# Patient Record
Sex: MALE | Race: WHITE | Employment: STUDENT | URBAN - NONMETROPOLITAN AREA
[De-identification: names, ages, dates, MRNs, and addresses within clinical notes are randomized per-mention and may not be internally consistent; named-entity substitution may affect disease eponyms.]

---

## 2018-01-08 ENCOUNTER — TRANSFERRED RECORDS (OUTPATIENT)
Dept: HEALTH INFORMATION MANAGEMENT | Facility: HOSPITAL | Age: 23
End: 2018-01-08

## 2018-01-17 ENCOUNTER — OFFICE VISIT (OUTPATIENT)
Dept: SURGERY | Facility: OTHER | Age: 23
End: 2018-01-17
Attending: SURGERY
Payer: COMMERCIAL

## 2018-01-17 VITALS
SYSTOLIC BLOOD PRESSURE: 120 MMHG | BODY MASS INDEX: 25.18 KG/M2 | DIASTOLIC BLOOD PRESSURE: 64 MMHG | TEMPERATURE: 97.2 F | HEIGHT: 69 IN | OXYGEN SATURATION: 100 % | WEIGHT: 170 LBS | HEART RATE: 73 BPM

## 2018-01-17 DIAGNOSIS — L05.91 PILONIDAL CYST: Primary | ICD-10-CM

## 2018-01-17 PROCEDURE — 99243 OFF/OP CNSLTJ NEW/EST LOW 30: CPT | Performed by: SURGERY

## 2018-01-17 ASSESSMENT — PAIN SCALES - GENERAL: PAINLEVEL: NO PAIN (1)

## 2018-01-17 NOTE — MR AVS SNAPSHOT
After Visit Summary   1/17/2018    Edmundo Guerrero    MRN: [id number]           Patient Information     Date Of Birth          1995        Visit Information        Provider Department      1/17/2018 1:00 PM Benjamin Howard MD [facility]        Care Instructions    Thank you for allowing Dr. Howard and our surgical team to participate in your care.  If you have a scheduling or an appointment question please contact our Health Unit Coordinator, Irene,  at her direct line [phone].   ALL nursing questions or concerns can be directed to your surgical nurse at: [phone] Jason    You are scheduled for a: Pilonidal Cystectomy  Your procedure date is: Monday, January 22, 2018        HOW TO PREPARE-          You need a friend or family member available to drive you home AND stay with you for 24 hours after you leave the hospital. You will not be allowed to drive yourself. IF you need to take a taxi or the bus you MUST have a responsible person to ride with you. YOUR PROCEDURE WILL BE CANCELLED IF YOU DO NOT HAVE A RESPONSIBLE ADULT TO DRIVE YOU HOME.       You CANNOT have anything to eat or drink after midnight the night before your surgery, ncluding water and coffee. Your stomach needs to be completely empty. Do NOT chew gum, suck on hard candy, or smoke. You can brush your teeth the morning of surgery.       You need to call our Surgery Education Nurses 1-2 weeks prior to your surgery date at  [phone] or toll free [phone]. Please have you medication and allergy lists ready.      Stop your aspirin or other NSAIDs(Ibuprofen, Motrin, Aleve, Celebrex, Naproxen, etc...) 7 days before your surgery.      Hospital admitting will call you the day before your surgery with your arrival time. If you are scheduled on a Monday admitting will call you the Friday before.      Please call your primary care physician if you should become ill within 24 hours of scheduled  "surgery. (ex.vomiting, diarrhea, fever)          You will need to wash the night before AND the morning of you procedure with the supplied Hibiclens. Wash your Surgical area with your bare hands, apply friction and rinse. KEEP IT AWAY FROM YOUR EYES, EARS, NOSE AND MOUTH.             Follow-ups after your visit        Who to contact     If you have questions or need follow up information about today's clinic visit or your schedule please [facility] at [phone].  Normal or non-critical lab and imaging results will be communicated to you by MyChart, letter or phone within 4 business days after the clinic has received the results. If you do not hear from us within 7 days, please contact the clinic through Familon[facility]art or phone. If you have a critical or abnormal lab result, we will notify you by phone as soon as possible.  Submit refill requests through Cliq or call your pharmacy and they will forward the refill request to us. Please allow 3 business days for your refill to be completed.          Additional Information About Your Visit        [facility] Information     Cliq lets you send messages to your doctor, view your test results, renew your prescriptions, schedule appointments and more. To sign up, go to [email or web address] . Click on \"Log in\" on the left side of the screen, which will take you to the Welcome page. Then click on \"Sign up Now\" on the right side of the page.     You will be asked to enter the access code listed below, as well as some personal information. Please follow the directions to create your username and password.     Your access code is: [email or web address]  Expires: 2018  1:32 PM     Your access code will  in 90 days. If you need help or a new code, please call your [place] clinic or [phone].        Care EveryWhere ID     This is your Care EveryWhere ID. This could be used by other organizations to access your [place] medical " "records  [id number]        Your Vitals Were     Pulse Temperature Height Pulse Oximetry BMI (Body Mass Index)       73 97.2  F (36.2  C) (Tympanic) 5' 9\" (1.753 m) 100% 25.1 kg/m2        Blood Pressure from Last 3 Encounters:   01/17/18 120/64    Weight from Last 3 Encounters:   01/17/18 170 lb (77.1 kg)              Today, you had the following     No orders found for display       Primary Care Provider Office Phone # Fax #    Jayme JENNYFER Canas [phone]       [facility] [place] [address] [place]  [place] [address]        Equal Access to Services     [facility] : Hadii denae Balbuena, waefrem sunshine, ortiz kaalmada iram, maximilian bolaños . So [facility] [phone].    ATENCIÓN: Si habla español, tiene a paniagua disposición servicios gratuitos de asistencia lingüística. Llame al [phone].    We comply with applicable federal civil rights laws and [place] laws. We do not discriminate on the basis of race, color, national origin, age, disability, sex, sexual orientation, or gender identity.            Thank you!     Thank you for choosing [facility]  for your care. Our goal is always to provide you with excellent care. Hearing back from our patients is one way we can continue to improve our services. Please take a few minutes to complete the written survey that you may receive in the mail after your visit with us. Thank you!             Your Updated Medication List - Protect others around you: Learn how to safely use, store and throw away your medicines at www.disposemymeds.org.      Notice  As of 1/17/2018  1:32 PM    You have not been prescribed any medications.      "

## 2018-01-17 NOTE — PROGRESS NOTES
"[facility] Surgery Consultation    CC:  Pilonidal cyst    HPI:  This 22 year old year old male is seen at the request of Jayme Canas for evaluation of pilonidal cyst.  The history is obtained from the patient, and reviewing the medical record.  He is good medical historian. He states that he has had problems with a pilonidal cyst for multiple years. He has had the cyst lanced at his local clinic and was prescribed antibiotics. The last time that he had significant pain associated with it was in December. At that time the cyst drained spontaneously. He has never had any pain with defecation, bleeding, or spreading erythema associated with the lesion.     No past medical history on file.    No past surgical history on file.    Pt denied problems with bleeding or anesthesia    No current outpatient prescriptions on file.        No Known Allergies      HABITS:    Social History   Substance Use Topics     Smoking status: Never Smoker     Smokeless tobacco: Never Used     Alcohol use Not on file     No mood altering drug use.    No family history on file.    REVIEW OF SYSTEMS:  Ten point review of systems negative except those mentioned in the HPI.     The patient denies sleep apnea, latex allergies or MRSA    OBJECTIVE:    /64 (BP Location: Left arm, Patient Position: Chair, Cuff Size: Adult Large)  Pulse 73  Temp 97.2  F (36.2  C) (Tympanic)  Ht 5' 9\" (1.753 m)  Wt 170 lb (77.1 kg)  SpO2 100%  BMI 25.1 kg/m2    GENERAL: Generally appears well, in no distress with appropriate affect.  HEENT:   Sclerae anicteric - No cervical, supra/infraclavicular lymphadenopathy, no thyroid masses  Respiratory:  Lungs clear to ausculation bilaterally with good air excursion  Cardiovascular:  Regular Rate and Rhythm with no murmurs gallops or rubs, normal   :  deferred  Extremities:  Extremities normal. No deformities, edema, or skin discoloration.  Skin:  Right superior gluteal cleft reveals a 1 cm area of heaped " granulation tissue at site of pilonidal cyst/sinus. There is no erythema to the surrounding tissue and there are two pits along the midline with hair coming out of the pits.   Neurological: grossly intact    Psych:  Alert, oriented, affect appropriate with normal decision making ability.      IMPRESSION:  22 year old male with pilonidal disease    PLAN:  I discussed with the patient that as he has ongoing issues with his pilonidal disease I would recommend excision. The technical aspects of the procedure was discussed including excision of the cyst and the pits with flap closure. Informed consent was obtained discussing the risks including but not limited to bleeding, infection, wound breakdown. All questions and concerns were answered. We will proceed forward at a mutually convenient time.     Thank you for allowing me to participate in the care of your patient.           Benjamin Howard MD    1/17/2018  2:13 PM    cc:  Jayme Canas

## 2018-01-17 NOTE — PATIENT INSTRUCTIONS
Thank you for allowing Dr. Howard and our surgical team to participate in your care.  If you have a scheduling or an appointment question please contact our Health Unit Coordinator, Irene,  at her direct line [phone].   ALL nursing questions or concerns can be directed to your surgical nurse at: [phone] Jason    You are scheduled for a: Pilonidal Cystectomy  Your procedure date is: Monday, January 22, 2018        HOW TO PREPARE-          You need a friend or family member available to drive you home AND stay with you for 24 hours after you leave the hospital. You will not be allowed to drive yourself. IF you need to take a taxi or the bus you MUST have a responsible person to ride with you. YOUR PROCEDURE WILL BE CANCELLED IF YOU DO NOT HAVE A RESPONSIBLE ADULT TO DRIVE YOU HOME.       You CANNOT have anything to eat or drink after midnight the night before your surgery, ncluding water and coffee. Your stomach needs to be completely empty. Do NOT chew gum, suck on hard candy, or smoke. You can brush your teeth the morning of surgery.       You need to call our Surgery Education Nurses 1-2 weeks prior to your surgery date at  [phone] or toll free [phone]. Please have you medication and allergy lists ready.      Stop your aspirin or other NSAIDs(Ibuprofen, Motrin, Aleve, Celebrex, Naproxen, etc...) 7 days before your surgery.      Hospital admitting will call you the day before your surgery with your arrival time. If you are scheduled on a Monday admitting will call you the Friday before.      Please call your primary care physician if you should become ill within 24 hours of scheduled surgery. (ex.vomiting, diarrhea, fever)          You will need to wash the night before AND the morning of you procedure with the supplied Hibiclens. Wash your Surgical area with your bare hands, apply friction and rinse. KEEP IT AWAY FROM YOUR EYES, EARS, NOSE AND MOUTH.

## 2018-01-17 NOTE — NURSING NOTE
"Chief Complaint   Patient presents with     Consult     Consult from Dr Canas for pilonidal cyst 1 cm offf midline       Initial /64 (BP Location: Left arm, Patient Position: Chair, Cuff Size: Adult Large)  Pulse 73  Temp 97.2  F (36.2  C) (Tympanic)  Ht 5' 9\" (1.753 m)  Wt 170 lb (77.1 kg)  SpO2 100%  BMI 25.1 kg/m2 Estimated body mass index is 25.1 kg/(m^2) as calculated from the following:    Height as of this encounter: 5' 9\" (1.753 m).    Weight as of this encounter: 170 lb (77.1 kg).  Medication Reconciliation: complete     JEZ CHÁVEZ      "

## 2018-01-22 ENCOUNTER — ANESTHESIA (OUTPATIENT)
Dept: SURGERY | Facility: HOSPITAL | Age: 23
End: 2018-01-22
Payer: COMMERCIAL

## 2018-01-22 ENCOUNTER — HOSPITAL ENCOUNTER (OUTPATIENT)
Facility: HOSPITAL | Age: 23
Discharge: HOME | End: 2018-01-22
Attending: SURGERY | Admitting: SURGERY
Payer: COMMERCIAL

## 2018-01-22 ENCOUNTER — SURGERY (OUTPATIENT)
Age: 23
End: 2018-01-22

## 2018-01-22 ENCOUNTER — ANESTHESIA EVENT (OUTPATIENT)
Dept: SURGERY | Facility: HOSPITAL | Age: 23
End: 2018-01-22
Payer: COMMERCIAL

## 2018-01-22 VITALS
TEMPERATURE: 98.3 F | BODY MASS INDEX: 25.18 KG/M2 | HEIGHT: 69 IN | RESPIRATION RATE: 16 BRPM | SYSTOLIC BLOOD PRESSURE: 138 MMHG | WEIGHT: 169.97 LBS | DIASTOLIC BLOOD PRESSURE: 75 MMHG | OXYGEN SATURATION: 100 %

## 2018-01-22 DIAGNOSIS — L05.91 PILONIDAL CYST: Primary | ICD-10-CM

## 2018-01-22 PROCEDURE — 88304 TISSUE EXAM BY PATHOLOGIST: CPT | Mod: TC | Performed by: SURGERY

## 2018-01-22 PROCEDURE — 11770 EXC PILONIDAL CYST SIMPLE: CPT | Performed by: ANESTHESIOLOGY

## 2018-01-22 PROCEDURE — 71000014 ZZH RECOVERY PHASE 1 LEVEL 2 FIRST HR: Performed by: SURGERY

## 2018-01-22 PROCEDURE — 37000009 ZZH ANESTHESIA TECHNICAL FEE, EACH ADDTL 15 MIN: Performed by: SURGERY

## 2018-01-22 PROCEDURE — 11771 EXC PILONIDAL CYST XTNSV: CPT | Performed by: SURGERY

## 2018-01-22 PROCEDURE — 25000128 H RX IP 250 OP 636: Performed by: ANESTHESIOLOGY

## 2018-01-22 PROCEDURE — 25000125 ZZHC RX 250: Performed by: ANESTHESIOLOGY

## 2018-01-22 PROCEDURE — 25000128 H RX IP 250 OP 636: Performed by: SURGERY

## 2018-01-22 PROCEDURE — 37000008 ZZH ANESTHESIA TECHNICAL FEE, 1ST 30 MIN: Performed by: SURGERY

## 2018-01-22 PROCEDURE — 36000052 ZZH SURGERY LEVEL 2 EA 15 ADDTL MIN: Performed by: SURGERY

## 2018-01-22 PROCEDURE — 27210794 ZZH OR GENERAL SUPPLY STERILE: Performed by: SURGERY

## 2018-01-22 PROCEDURE — 25000128 H RX IP 250 OP 636: Performed by: NURSE ANESTHETIST, CERTIFIED REGISTERED

## 2018-01-22 PROCEDURE — 25000125 ZZHC RX 250: Performed by: NURSE ANESTHETIST, CERTIFIED REGISTERED

## 2018-01-22 PROCEDURE — 25000125 ZZHC RX 250: Performed by: SURGERY

## 2018-01-22 PROCEDURE — 71000027 ZZH RECOVERY PHASE 2 EACH 15 MINS: Performed by: SURGERY

## 2018-01-22 PROCEDURE — 40000306 ZZH STATISTIC PRE PROC ASSESS II: Performed by: SURGERY

## 2018-01-22 PROCEDURE — 36000050 ZZH SURGERY LEVEL 2 1ST 30 MIN: Performed by: SURGERY

## 2018-01-22 PROCEDURE — 01999 UNLISTED ANES PROCEDURE: CPT | Performed by: NURSE ANESTHETIST, CERTIFIED REGISTERED

## 2018-01-22 RX ORDER — FENTANYL CITRATE 50 UG/ML
INJECTION, SOLUTION INTRAMUSCULAR; INTRAVENOUS PRN
Status: DISCONTINUED | OUTPATIENT
Start: 2018-01-22 | End: 2018-01-22

## 2018-01-22 RX ORDER — MEPERIDINE HYDROCHLORIDE 25 MG/ML
12.5 INJECTION INTRAMUSCULAR; INTRAVENOUS; SUBCUTANEOUS
Status: DISCONTINUED | OUTPATIENT
Start: 2018-01-22 | End: 2018-01-22 | Stop reason: HOSPADM

## 2018-01-22 RX ORDER — FENTANYL CITRATE 50 UG/ML
25-50 INJECTION, SOLUTION INTRAMUSCULAR; INTRAVENOUS
Status: DISCONTINUED | OUTPATIENT
Start: 2018-01-22 | End: 2018-01-22 | Stop reason: HOSPADM

## 2018-01-22 RX ORDER — KETOROLAC TROMETHAMINE 30 MG/ML
30 INJECTION, SOLUTION INTRAMUSCULAR; INTRAVENOUS EVERY 6 HOURS PRN
Status: DISCONTINUED | OUTPATIENT
Start: 2018-01-22 | End: 2018-01-22 | Stop reason: HOSPADM

## 2018-01-22 RX ORDER — BUPIVACAINE HYDROCHLORIDE 2.5 MG/ML
INJECTION, SOLUTION EPIDURAL; INFILTRATION; INTRACAUDAL PRN
Status: DISCONTINUED | OUTPATIENT
Start: 2018-01-22 | End: 2018-01-22 | Stop reason: HOSPADM

## 2018-01-22 RX ORDER — SODIUM CHLORIDE, SODIUM LACTATE, POTASSIUM CHLORIDE, CALCIUM CHLORIDE 600; 310; 30; 20 MG/100ML; MG/100ML; MG/100ML; MG/100ML
INJECTION, SOLUTION INTRAVENOUS CONTINUOUS
Status: DISCONTINUED | OUTPATIENT
Start: 2018-01-22 | End: 2018-01-22 | Stop reason: HOSPADM

## 2018-01-22 RX ORDER — AMOXICILLIN 250 MG
1-2 CAPSULE ORAL 2 TIMES DAILY
Qty: 30 TABLET | Refills: 0 | Status: SHIPPED | OUTPATIENT
Start: 2018-01-22 | End: 2018-01-31

## 2018-01-22 RX ORDER — NALOXONE HYDROCHLORIDE 0.4 MG/ML
.1-.4 INJECTION, SOLUTION INTRAMUSCULAR; INTRAVENOUS; SUBCUTANEOUS
Status: DISCONTINUED | OUTPATIENT
Start: 2018-01-22 | End: 2018-01-22 | Stop reason: HOSPADM

## 2018-01-22 RX ORDER — PROPOFOL 10 MG/ML
INJECTION, EMULSION INTRAVENOUS PRN
Status: DISCONTINUED | OUTPATIENT
Start: 2018-01-22 | End: 2018-01-22

## 2018-01-22 RX ORDER — ONDANSETRON 2 MG/ML
INJECTION INTRAMUSCULAR; INTRAVENOUS PRN
Status: DISCONTINUED | OUTPATIENT
Start: 2018-01-22 | End: 2018-01-22

## 2018-01-22 RX ORDER — ONDANSETRON 4 MG/1
4 TABLET, ORALLY DISINTEGRATING ORAL EVERY 30 MIN PRN
Status: DISCONTINUED | OUTPATIENT
Start: 2018-01-22 | End: 2018-01-22 | Stop reason: HOSPADM

## 2018-01-22 RX ORDER — PROMETHAZINE HYDROCHLORIDE 25 MG/ML
12.5 INJECTION, SOLUTION INTRAMUSCULAR; INTRAVENOUS
Status: DISCONTINUED | OUTPATIENT
Start: 2018-01-22 | End: 2018-01-22 | Stop reason: HOSPADM

## 2018-01-22 RX ORDER — ALBUTEROL SULFATE 0.83 MG/ML
2.5 SOLUTION RESPIRATORY (INHALATION) EVERY 4 HOURS PRN
Status: DISCONTINUED | OUTPATIENT
Start: 2018-01-22 | End: 2018-01-22 | Stop reason: HOSPADM

## 2018-01-22 RX ORDER — HYDRALAZINE HYDROCHLORIDE 20 MG/ML
2.5-5 INJECTION INTRAMUSCULAR; INTRAVENOUS EVERY 10 MIN PRN
Status: DISCONTINUED | OUTPATIENT
Start: 2018-01-22 | End: 2018-01-22 | Stop reason: HOSPADM

## 2018-01-22 RX ORDER — DEXAMETHASONE SODIUM PHOSPHATE 10 MG/ML
INJECTION, SOLUTION INTRAMUSCULAR; INTRAVENOUS PRN
Status: DISCONTINUED | OUTPATIENT
Start: 2018-01-22 | End: 2018-01-22

## 2018-01-22 RX ORDER — KETOROLAC TROMETHAMINE 30 MG/ML
INJECTION, SOLUTION INTRAMUSCULAR; INTRAVENOUS PRN
Status: DISCONTINUED | OUTPATIENT
Start: 2018-01-22 | End: 2018-01-22

## 2018-01-22 RX ORDER — OXYCODONE HYDROCHLORIDE 5 MG/1
5-10 TABLET ORAL EVERY 4 HOURS PRN
Qty: 30 TABLET | Refills: 0 | Status: SHIPPED | OUTPATIENT
Start: 2018-01-22 | End: 2018-01-31

## 2018-01-22 RX ORDER — LIDOCAINE HYDROCHLORIDE 20 MG/ML
INJECTION, SOLUTION INFILTRATION; PERINEURAL PRN
Status: DISCONTINUED | OUTPATIENT
Start: 2018-01-22 | End: 2018-01-22

## 2018-01-22 RX ORDER — HYDROMORPHONE HYDROCHLORIDE 1 MG/ML
.3-.5 INJECTION, SOLUTION INTRAMUSCULAR; INTRAVENOUS; SUBCUTANEOUS EVERY 10 MIN PRN
Status: DISCONTINUED | OUTPATIENT
Start: 2018-01-22 | End: 2018-01-22 | Stop reason: HOSPADM

## 2018-01-22 RX ORDER — SCOLOPAMINE TRANSDERMAL SYSTEM 1 MG/1
1 PATCH, EXTENDED RELEASE TRANSDERMAL ONCE
Status: COMPLETED | OUTPATIENT
Start: 2018-01-22 | End: 2018-01-22

## 2018-01-22 RX ORDER — CEFAZOLIN SODIUM 2 G/100ML
2 INJECTION, SOLUTION INTRAVENOUS
Status: COMPLETED | OUTPATIENT
Start: 2018-01-22 | End: 2018-01-22

## 2018-01-22 RX ORDER — ONDANSETRON 2 MG/ML
4 INJECTION INTRAMUSCULAR; INTRAVENOUS EVERY 30 MIN PRN
Status: DISCONTINUED | OUTPATIENT
Start: 2018-01-22 | End: 2018-01-22 | Stop reason: HOSPADM

## 2018-01-22 RX ORDER — DEXAMETHASONE SODIUM PHOSPHATE 4 MG/ML
4 INJECTION, SOLUTION INTRA-ARTICULAR; INTRALESIONAL; INTRAMUSCULAR; INTRAVENOUS; SOFT TISSUE EVERY 10 MIN PRN
Status: DISCONTINUED | OUTPATIENT
Start: 2018-01-22 | End: 2018-01-22 | Stop reason: HOSPADM

## 2018-01-22 RX ADMIN — FENTANYL CITRATE 50 MCG: 50 INJECTION, SOLUTION INTRAMUSCULAR; INTRAVENOUS at 14:55

## 2018-01-22 RX ADMIN — LIDOCAINE HYDROCHLORIDE 40 MG: 20 INJECTION, SOLUTION INFILTRATION; PERINEURAL at 14:37

## 2018-01-22 RX ADMIN — HYDROMORPHONE HYDROCHLORIDE 1 MG: 1 INJECTION, SOLUTION INTRAMUSCULAR; INTRAVENOUS; SUBCUTANEOUS at 15:01

## 2018-01-22 RX ADMIN — BUPIVACAINE HYDROCHLORIDE 20 ML: 2.5 INJECTION, SOLUTION EPIDURAL; INFILTRATION; INTRACAUDAL at 15:23

## 2018-01-22 RX ADMIN — KETOROLAC TROMETHAMINE 30 MG: 30 INJECTION, SOLUTION INTRAMUSCULAR at 15:26

## 2018-01-22 RX ADMIN — SCOPALAMINE 1 PATCH: 1 PATCH, EXTENDED RELEASE TRANSDERMAL at 11:48

## 2018-01-22 RX ADMIN — Medication 100 MG: at 14:38

## 2018-01-22 RX ADMIN — CEFAZOLIN SODIUM 2 G: 2 INJECTION, SOLUTION INTRAVENOUS at 14:30

## 2018-01-22 RX ADMIN — FENTANYL CITRATE 100 MCG: 50 INJECTION, SOLUTION INTRAMUSCULAR; INTRAVENOUS at 14:33

## 2018-01-22 RX ADMIN — DEXAMETHASONE SODIUM PHOSPHATE 6 MG: 10 INJECTION, SOLUTION INTRAMUSCULAR; INTRAVENOUS at 14:48

## 2018-01-22 RX ADMIN — ONDANSETRON 4 MG: 2 INJECTION INTRAMUSCULAR; INTRAVENOUS at 15:26

## 2018-01-22 RX ADMIN — MEPERIDINE HYDROCHLORIDE 12.5 MG: 25 INJECTION, SOLUTION INTRAMUSCULAR; INTRAVENOUS; SUBCUTANEOUS at 16:08

## 2018-01-22 RX ADMIN — SODIUM CHLORIDE, POTASSIUM CHLORIDE, SODIUM LACTATE AND CALCIUM CHLORIDE: 600; 310; 30; 20 INJECTION, SOLUTION INTRAVENOUS at 11:48

## 2018-01-22 RX ADMIN — PROPOFOL 200 MG: 10 INJECTION, EMULSION INTRAVENOUS at 14:37

## 2018-01-22 RX ADMIN — MIDAZOLAM 2 MG: 1 INJECTION INTRAMUSCULAR; INTRAVENOUS at 14:30

## 2018-01-22 NOTE — IP AVS SNAPSHOT
MRN:[id number]                      After Visit Summary   1/22/2018    Edmundo Guerrero    MRN: [id number]           Thank you!     Thank you for choosing [facility] for your care. Our goal is always to provide you with excellent care. Hearing back from our patients is one way we can continue to improve our services. Please take a few minutes to complete the written survey that you may receive in the mail after you visit with us. Thank you!        Patient Information     Date Of Birth          1995        About your hospital stay     You were admitted on:  January 22, 2018 You last received care in the:  [facility] Preop/Phase II    You were discharged on:  January 22, 2018       Who to Call     For medical emergencies, please call 911.  For non-urgent questions about your medical care, please call your primary care provider or clinic, [phone]  For questions related to your surgery, please call your surgery clinic        Attending Provider     Provider Benjamin Felix MD Surgery       Primary Care Provider Office Phone # Fax #    Jayme Canas [phone]      After Care Instructions     Diet Instructions       Resume pre-procedure diet            Discharge Instructions       Patient to follow up with appointment in 2 weeks            Dressing       Keep dressing clean and dry.  Dressing / incisional care as instructed by RN and or Surgeon. May remove occlusive dressing in 48 hours.            Ice to affected area       Ice to operative site PRN            Shower       Do not get incision wet for 48 hours. After 48 hours you may remove the occlusive dressing and then the incision may get wet. There are steri-strips underlying the occlusive dressing which will fall off in 7-10 days.                  Further instructions from your care team                             Post-Anesthesia Patient Instructions    IMMEDIATELY FOLLOWING SURGERY:  Do not drive or operate  machinery for the first twenty four hours after surgery.  Do not make any important decisions for twenty four hours after surgery or while taking narcotic pain medications or sedatives.  If you develop intractable nausea and vomiting or a severe headache please notify your doctor immediately.    FOLLOW-UP:  Please make an appointment with your surgeon as instructed. You do not need to follow up with anesthesia unless specifically instructed to do so.    WOUND CARE INSTRUCTIONS (if applicable):  Keep a dry clean dressing on the anesthesia/puncture wound site if there is drainage.  Once the wound has quit draining you may leave it open to air.  Generally you should leave the bandage intact for twenty four hours unless there is drainage.  If the epidural site drains for more than 36-48 hours please call the anesthesia department.    QUESTIONS?:  Please feel free to call your physician or the hospital  if you have any questions, and they will be happy to assist you.       Remove the scopolamine patch behind your right ear after 24 hours after application.   After removing the patch, wash your hands and the area behind your ear thoroughly with soap and water.   The patch will still contain some medicine after use.   To avoid accidental contact or ingestion by children or pets, fold the used patch in half with the sticky side together and throw away in the trash out of the reach of children and pets.             Pilonidal Cyst    A pilonidal cyst is found near the base of the spine (tailbone) or top of the buttocks crease. It may look like a pit or small depression. In some cases, it may have a hollow tunnel (sinus tract) that connects it to the surface of the skin. Normally, a pilonidal cyst does not cause symptoms. But if it becomes infected, it can cause pain and swelling. This sheet tells you more about pilonidal cysts and how they are treated.  What causes a pilonidal cyst and who gets them?  Two main causes  are:    Ingrown hairs. This happens when a hair is forced under the skin or when a hair follicle ruptures.    Injury to the area. This can happen from sitting for long periods of time.  These cysts are often diagnosed in people between ages 16 and 26. But people of any age can have a pilonidal cyst. They affect both men and women, but they are more common in men.  Symptoms of a pilonidal cyst infection  A pilonidal cyst does not cause symptoms unless it becomes infected. Once a pilonidal cyst becomes infected, it is called a pilonidal abscess. Infection may cause the following symptoms:    Pain, redness, and swelling of the cyst and area around it    Foul-smelling drainage from the cyst    Fever  Diagnosing a pilonidal cyst  A pilonidal cyst can be diagnosed by how it looks and by its location. Your healthcare provider will examine the suspected cyst to confirm a diagnosis. You will be told if any tests are needed.  Treating a pilonidal cyst infection  Most pilonidal cysts are left alone. But if a cyst becomes infected, treatment is needed. It may include the following:    Incision and drainage. If needed, the cyst is cut open, and pus and other infected material is allowed to drain.    Antibiotic medicines for the infection. Know that medicines do not make the cyst go away, and antibiotics have limited use in treating an abscess. They also won t keep a cyst from becoming infected again.    Hot water soaks. These can help draw out the infection and ease pain and itching.    Surgery to remove the cyst (excision). This may be done if the infection is severe, does not respond to medicine, or keeps coming back. A surgeon cuts and removes the cyst and the tissue around it. Your healthcare provider can tell you more if this is needed.    Laser hair removalaround the area. This may decrease the frequency of flare-ups.  Preventing infection  A pilonidal cyst can easily become infected. Do the following to help prevent  infections:    Keep the cyst and surrounding skin area clean.    Remove hair from the area of the cyst regularly. Ask your healthcare provider about safe hair removal products or procedures.    Avoid sitting in one position for long periods of time. This helps to reduce weight and pressure on your tailbone area. Sitting on a special cushion to relieve pressure on the tailbone may also help. Ask your healthcare provider about where to purchase these cushions.    Avoid tight-fitting clothing to reduce skin irritation around the cyst.  Date Last Reviewed: 2/1/2017 2000-2017 [employer]. [address] [place] [address]. All rights reserved. This information is not intended as a substitute for professional medical care. Always follow your healthcare professional's instructions.        Incision Care  Remember: Follow-up visits allow your healthcare provider to make sure your incision is healing well. Be sure to keep your appointments.     Stitches (sutures), surgical staples, special strips of surgical tape, or surgical skin glue may be used to close incisions. They also help stop bleeding and speed healing. To help your incision heal, follow the tips on this handout.  Home care  Tips for home care include the following:    Always wash your hands before touching your incision.    Keep your incision clean and dry.    Avoid doing things that could cause dirt or sweat to get on your incision.    Don t pick at scabs. They help protect the wound.    Keep your incision out of water.    Take a sponge bath to avoid getting your incision wet, unless your healthcare provider tells you otherwise.    Ask your provider when can you take a shower or bathe.    Ask your provider about the best way to keep your incision dry when bathing or showering.    Pat stitches dry if they get wet. Don t rub.    Leave the bandage (dressing) in place until you are told to remove it or change it. Change it only as directed,  using clean hands.    After the first 12 hours, change your dressing every 24 hours, or as directed by your healthcare provider.    Change your dressing if it gets wet or soiled.  Care for specific closures  Follow these guidelines unless your healthcare provider tells you otherwise:    Stitches or staples. Once you no longer need to keep these dry, clean the wound daily. First remove the bandage using clean hands. Then wash the area gently with soap and warm water. Use a wet cotton swab to loosen and remove any blood or crust that forms. After cleaning, put a thin layer of antibiotic ointment on. Then put on a new bandage.    Skin glue. Don t put liquid, ointment, or cream on your wound while the glue is in place. Avoid activities that cause heavy sweating. Protect the wound from sunlight. Do not scratch, rub, or pick at the glue. Do not put tape directly over the glue. The glue should peel off within 5 to 10 days.    Surgical tape. Keep the area dry. If it gets wet, blot the area dry with a clean towel. Surgical tape usually falls off within 7 to 10 days. If it has not fallen off after 10 days, contact your healthcare provider before taking it off yourself. If you are told to remove the tape, put mineral oil or petroleum jelly on a cotton ball. Gently rub the tape until it is removed.  Changing your dressing  Leave the dressing (bandage) in place until you are told to remove it or change it. Follow the instructions below unless told otherwise by your healthcare provider:    Always wash your hands before changing your dressing.    After the first 48 hours, the incision wound usually will have closed. At this point, leave the incision uncovered and open to the air. If the incision has not closed keep it covered.    Cover your incision only if your clothing is rubbing it or causing irritation.    Change your dressing if it gets wet or soiled.  Follow-up care  Follow up with your healthcare provider to ask how long  "sutures or staples should be left in place. Be sure to return for stitch or staple removal as directed. If dissolving stitches were used in your mouth, these will not need to be removed. They should fall out or dissolve on their own.  If tape closures were used, remove them yourself when your provider recommends if they have not fallen off on their own. If skin glue was used, the glue will wear off by itself.  When to seek medical care  Call your healthcare provider if you have any of the following:    More pain, redness, swelling, bleeding, or foul-smelling discharge around the incision area    Fever of 100.4 F (38 C) or higher, or as directed by your healthcare provider    Shaking chills    Vomiting or nausea that doesn't go away    Numbness, coldness, or tingling around the incision area, or changes in skin color    Opening of the sutures or wound    Stitches or staples come apart or fall out or surgical tape falls off before 7 days or as directed by your healthcare provider   Date Last Reviewed: 12/1/2016 2000-2017 [employer]. [address] [place] [address]. All rights reserved. This information is not intended as a substitute for professional medical care. Always follow your healthcare professional's instructions.              Pending Results     No orders found from 1/20/2018 to 1/23/2018.            Admission Information     Date & Time Provider Department Dept. Phone    1/22/2018 Benjamin Howard MD [facility] Preop/Phase [phone]      Your Vitals Were     Blood Pressure Temperature Respirations Height Weight Pulse Oximetry    153/87 98.3  F (36.8  C) 16 1.753 m (5' 9\") 77.1 kg (169 lb 15.6 oz) 98%    BMI (Body Mass Index)                   25.1 kg/m2           MyChart Information     [facility] lets you send messages to your doctor, view your test results, renew your prescriptions, schedule appointments and more. To sign up, go to [email or web address] . Click on \"Log in\" on " "the left side of the screen, which will take you to the Welcome page. Then click on \"Sign up Now\" on the right side of the page.     You will be asked to enter the access code listed below, as well as some personal information. Please follow the directions to create your username and password.     Your access code is: [email or web address]  Expires: 2018  1:32 PM     Your access code will  in 90 days. If you need help or a new code, please call your [facility] clinic or [phone].        Care EveryWhere ID     This is your Care EveryWhere ID. This could be used by other organizations to access your [facility] medical records  [id number]        Equal Access to Services     DIAN CULP : Shyann Balbuena, krys sunshine, ortiz kaalmalaura walden, maximilian trinh. So [facility] [phone].    ATENCIÓN: Si habla español, tiene a paniagua disposición servicios gratuitos de asistencia lingüística. Llame al [phone].    We comply with applicable federal civil rights laws and [place] laws. We do not discriminate on the basis of race, color, national origin, age, disability, sex, sexual orientation, or gender identity.               Review of your medicines      START taking        Dose / Directions    oxyCODONE IR 5 MG tablet   Commonly known as:  ROXICODONE   Used for:  Pilonidal cyst        Dose:  5-10 mg   Take 1-2 tablets (5-10 mg) by mouth every 4 hours as needed for pain or other (Moderate to Severe)   Quantity:  30 tablet   Refills:  0       senna-docusate 8.6-50 MG per tablet   Commonly known as:  SENOKOT-S;PERICOLACE   Used for:  Pilonidal cyst        Dose:  1-2 tablet   Take 1-2 tablets by mouth 2 times daily Take while on oral narcotics to prevent or treat constipation.   Quantity:  30 tablet   Refills:  0            Where to get your medicines      These medications were sent to [facility] - [address]  [address] [address]    "  Phone:  [phone]     senna-docusate 8.6-50 MG per tablet         Some of these will need a paper prescription and others can be bought over the counter. Ask your nurse if you have questions.     Bring a paper prescription for each of these medications     oxyCODONE IR 5 MG tablet                Protect others around you: Learn how to safely use, store and throw away your medicines at www.disposemymeds.org.             Medication List: This is a list of all your medications and when to take them. Check marks below indicate your daily home schedule. Keep this list as a reference.      Medications           Morning Afternoon Evening Bedtime As Needed    oxyCODONE IR 5 MG tablet   Commonly known as:  ROXICODONE   Take 1-2 tablets (5-10 mg) by mouth every 4 hours as needed for pain or other (Moderate to Severe)                                senna-docusate 8.6-50 MG per tablet   Commonly known as:  SENOKOT-S;PERICOLACE   Take 1-2 tablets by mouth 2 times daily Take while on oral narcotics to prevent or treat constipation.

## 2018-01-22 NOTE — DISCHARGE INSTRUCTIONS
Post-Anesthesia Patient Instructions    IMMEDIATELY FOLLOWING SURGERY:  Do not drive or operate machinery for the first twenty four hours after surgery.  Do not make any important decisions for twenty four hours after surgery or while taking narcotic pain medications or sedatives.  If you develop intractable nausea and vomiting or a severe headache please notify your doctor immediately.    FOLLOW-UP:  Please make an appointment with your surgeon as instructed. You do not need to follow up with anesthesia unless specifically instructed to do so.    WOUND CARE INSTRUCTIONS (if applicable):  Keep a dry clean dressing on the anesthesia/puncture wound site if there is drainage.  Once the wound has quit draining you may leave it open to air.  Generally you should leave the bandage intact for twenty four hours unless there is drainage.  If the epidural site drains for more than 36-48 hours please call the anesthesia department.    QUESTIONS?:  Please feel free to call your physician or the hospital  if you have any questions, and they will be happy to assist you.       Remove the scopolamine patch behind your right ear after 24 hours after application.   After removing the patch, wash your hands and the area behind your ear thoroughly with soap and water.   The patch will still contain some medicine after use.   To avoid accidental contact or ingestion by children or pets, fold the used patch in half with the sticky side together and throw away in the trash out of the reach of children and pets.             Pilonidal Cyst    A pilonidal cyst is found near the base of the spine (tailbone) or top of the buttocks crease. It may look like a pit or small depression. In some cases, it may have a hollow tunnel (sinus tract) that connects it to the surface of the skin. Normally, a pilonidal cyst does not cause symptoms. But if it becomes infected, it can cause pain and swelling. This sheet tells you  more about pilonidal cysts and how they are treated.  What causes a pilonidal cyst and who gets them?  Two main causes are:    Ingrown hairs. This happens when a hair is forced under the skin or when a hair follicle ruptures.    Injury to the area. This can happen from sitting for long periods of time.  These cysts are often diagnosed in people between ages 16 and 26. But people of any age can have a pilonidal cyst. They affect both men and women, but they are more common in men.  Symptoms of a pilonidal cyst infection  A pilonidal cyst does not cause symptoms unless it becomes infected. Once a pilonidal cyst becomes infected, it is called a pilonidal abscess. Infection may cause the following symptoms:    Pain, redness, and swelling of the cyst and area around it    Foul-smelling drainage from the cyst    Fever  Diagnosing a pilonidal cyst  A pilonidal cyst can be diagnosed by how it looks and by its location. Your healthcare provider will examine the suspected cyst to confirm a diagnosis. You will be told if any tests are needed.  Treating a pilonidal cyst infection  Most pilonidal cysts are left alone. But if a cyst becomes infected, treatment is needed. It may include the following:    Incision and drainage. If needed, the cyst is cut open, and pus and other infected material is allowed to drain.    Antibiotic medicines for the infection. Know that medicines do not make the cyst go away, and antibiotics have limited use in treating an abscess. They also won t keep a cyst from becoming infected again.    Hot water soaks. These can help draw out the infection and ease pain and itching.    Surgery to remove the cyst (excision). This may be done if the infection is severe, does not respond to medicine, or keeps coming back. A surgeon cuts and removes the cyst and the tissue around it. Your healthcare provider can tell you more if this is needed.    Laser hair removalaround the area. This may decrease the frequency  of flare-ups.  Preventing infection  A pilonidal cyst can easily become infected. Do the following to help prevent infections:    Keep the cyst and surrounding skin area clean.    Remove hair from the area of the cyst regularly. Ask your healthcare provider about safe hair removal products or procedures.    Avoid sitting in one position for long periods of time. This helps to reduce weight and pressure on your tailbone area. Sitting on a special cushion to relieve pressure on the tailbone may also help. Ask your healthcare provider about where to purchase these cushions.    Avoid tight-fitting clothing to reduce skin irritation around the cyst.  Date Last Reviewed: 2/1/2017 2000-2017 [employer]. [address] [place] [address], [place] [address]. All rights reserved. This information is not intended as a substitute for professional medical care. Always follow your healthcare professional's instructions.        Incision Care  Remember: Follow-up visits allow your healthcare provider to make sure your incision is healing well. Be sure to keep your appointments.     Stitches (sutures), surgical staples, special strips of surgical tape, or surgical skin glue may be used to close incisions. They also help stop bleeding and speed healing. To help your incision heal, follow the tips on this handout.  Home care  Tips for home care include the following:    Always wash your hands before touching your incision.    Keep your incision clean and dry.    Avoid doing things that could cause dirt or sweat to get on your incision.    Don t pick at scabs. They help protect the wound.    Keep your incision out of water.    Take a sponge bath to avoid getting your incision wet, unless your healthcare provider tells you otherwise.    Ask your provider when can you take a shower or bathe.    Ask your provider about the best way to keep your incision dry when bathing or showering.    Pat stitches dry if they get wet. Don t  rub.    Leave the bandage (dressing) in place until you are told to remove it or change it. Change it only as directed, using clean hands.    After the first 12 hours, change your dressing every 24 hours, or as directed by your healthcare provider.    Change your dressing if it gets wet or soiled.  Care for specific closures  Follow these guidelines unless your healthcare provider tells you otherwise:    Stitches or staples. Once you no longer need to keep these dry, clean the wound daily. First remove the bandage using clean hands. Then wash the area gently with soap and warm water. Use a wet cotton swab to loosen and remove any blood or crust that forms. After cleaning, put a thin layer of antibiotic ointment on. Then put on a new bandage.    Skin glue. Don t put liquid, ointment, or cream on your wound while the glue is in place. Avoid activities that cause heavy sweating. Protect the wound from sunlight. Do not scratch, rub, or pick at the glue. Do not put tape directly over the glue. The glue should peel off within 5 to 10 days.    Surgical tape. Keep the area dry. If it gets wet, blot the area dry with a clean towel. Surgical tape usually falls off within 7 to 10 days. If it has not fallen off after 10 days, contact your healthcare provider before taking it off yourself. If you are told to remove the tape, put mineral oil or petroleum jelly on a cotton ball. Gently rub the tape until it is removed.  Changing your dressing  Leave the dressing (bandage) in place until you are told to remove it or change it. Follow the instructions below unless told otherwise by your healthcare provider:    Always wash your hands before changing your dressing.    After the first 48 hours, the incision wound usually will have closed. At this point, leave the incision uncovered and open to the air. If the incision has not closed keep it covered.    Cover your incision only if your clothing is rubbing it or  causing irritation.    Change your dressing if it gets wet or soiled.  Follow-up care  Follow up with your healthcare provider to ask how long sutures or staples should be left in place. Be sure to return for stitch or staple removal as directed. If dissolving stitches were used in your mouth, these will not need to be removed. They should fall out or dissolve on their own.  If tape closures were used, remove them yourself when your provider recommends if they have not fallen off on their own. If skin glue was used, the glue will wear off by itself.  When to seek medical care  Call your healthcare provider if you have any of the following:    More pain, redness, swelling, bleeding, or foul-smelling discharge around the incision area    Fever of 100.4 F (38 C) or higher, or as directed by your healthcare provider    Shaking chills    Vomiting or nausea that doesn't go away    Numbness, coldness, or tingling around the incision area, or changes in skin color    Opening of the sutures or wound    Stitches or staples come apart or fall out or surgical tape falls off before 7 days or as directed by your healthcare provider   Date Last Reviewed: 12/1/2016 2000-2017 [employer]. [address] [place] [address], [place] [address]. All rights reserved. This information is not intended as a substitute for professional medical care. Always follow your healthcare professional's instructions.

## 2018-01-22 NOTE — OR NURSING
Pateint discharged to home.  Alfonso score 19. Pain level 0/10.  Discharged from unit via wheelchair.

## 2018-01-22 NOTE — IP AVS SNAPSHOT
[facility] Preop/Phase II    [address] [address]    Phone:  [phone]                                       After Visit Summary   1/22/2018    Edmundo Guerrero    MRN: [id number]           After Visit Summary Signature Page     I have received my discharge instructions, and my questions have been answered. I have discussed any challenges I see with this plan with the nurse or doctor.    ..........................................................................................................................................  Patient/Patient Representative Signature      ..........................................................................................................................................  Patient Representative Print Name and Relationship to Patient    ..................................................               ................................................  Date                                            Time    ..........................................................................................................................................  Reviewed by Signature/Title    ...................................................              ..............................................  Date                                                            Time

## 2018-01-22 NOTE — ANESTHESIA PREPROCEDURE EVALUATION
Anesthesia Evaluation     . Pt has not had prior anesthetic            ROS/MED HX    ENT/Pulmonary:       Neurologic:  - neg neurologic ROS     Cardiovascular:  - neg cardiovascular ROS       METS/Exercise Tolerance:     Hematologic:  - neg hematologic  ROS       Musculoskeletal:   (+) , , other musculoskeletal- PILONIDAL CYST      GI/Hepatic:  - neg GI/hepatic ROS       Renal/Genitourinary:  - ROS Renal section negative       Endo:  - neg endo ROS       Psychiatric:  - neg psychiatric ROS       Infectious Disease:  - neg infectious disease ROS       Malignancy:      - no malignancy   Other:    - neg other ROS                 Physical Exam  Normal systems: cardiovascular, pulmonary and dental    Airway   Mallampati: III  TM distance: >3 FB  Neck ROM: full    Dental     Cardiovascular   Rhythm and rate: regular and normal      Pulmonary    breath sounds clear to auscultation                    Anesthesia Plan      History & Physical Review  History and physical reviewed and following examination; no interval change.    ASA Status:  2 .    NPO Status:  > 8 hours    Plan for General and ETT with Intravenous and Propofol induction. Maintenance will be Balanced.    PONV prophylaxis:  Ondansetron (or other 5HT-3), Scopolamine patch and Dexamethasone or Solumedrol  Prone Positioning       Postoperative Care  Postoperative pain management:  IV analgesics and Oral pain medications.      Consents  Anesthetic plan, risks, benefits and alternatives discussed with:  Patient..                          .

## 2018-01-22 NOTE — OP NOTE
PREOPERATIVE DIAGNOSIS:  Pilonidal sinus with repeated infections.      POSTOPERATIVE DIAGNOSIS:  Pilonidal sinus with repeated infections. Pilonidal cyst.     PROCEDURE PERFORMED:  Excision pilonidal cyst/sinus with primary repair.      HISTORY:  This 22-year-old male has had repetitive infections with a large pilonidal process.  He presents for elective repair.      DESCRIPTION OF PROCEDURE:  With the patient in the supine position on the operating table, general anesthesia was induced.  He was rolled to the prone position with a roll beneath the pubis and the perianal region, buttocks, and spine were prepped and draped sterilely.  The requisite timeout pause was observed during which the patient's correct identity and planned procedure was confirmed by the operating room personnel in attendance. There was a small sinus just superior to the anus that had jes of hair protruding from it. The hair was removed. The sinus was then curretted and irrigated.  An elliptical incision to include visible operculae and, sinus tracts and jes of hair was taken through full thickness skin and subcutaneous tissue.  The skin and full thickness-subcutaneous tissue was elevated off the presacral fascia and the incision was extended to include the sinus tracts, both to the left and right of midline.  The specimen was excised and submitted for histopathologic examination.  The wound was irrigated with copious amounts of saline solution and closed in layers to obliterate all dead space.  The deep tissues were reapproximated to the presacral fascia with interrupted 3-0 vicryl.  The superficial subcutaneous tissues were reapproximated with 3-0 vicryl.  Subcutaneous tissues were further reapproximated with interrupted 3-0 vicryl and the skin was reapproximated with subdermal 4-0 monocryl and steri-strips.  Sterile dressings were applied and the patient was transported to the recovery room in satisfactory condition.  The estimated blood  loss was minimal and there were no apparent complications.  The procedure was well tolerated and the patient left the operating room in good condition upon confirmation that the final sponge, needle and instrument counts were correct.        Benjamin Howard  1/22/2018

## 2018-01-22 NOTE — ANESTHESIA CARE TRANSFER NOTE
Patient: Edmundo Guerrero    Procedure(s):  PILONIDAL CYSTECTOMY - Wound Class: II-Clean Contaminated    Diagnosis: PILONIDAL CYST  Diagnosis Additional Information: No value filed.    Anesthesia Type:   General, ETT     Note:  Airway :Room Air  Patient transferred to:PACU  Handoff Report: Identifed the Patient, Identified the Reponsible Provider, Reviewed the pertinent medical history, Discussed the surgical course, Reviewed Intra-OP anesthesia mangement and issues during anesthesia, Set expectations for post-procedure period and Allowed opportunity for questions and acknowledgement of understanding      Vitals: (Last set prior to Anesthesia Care Transfer)    CRNA VITALS  1/22/2018 1503 - 1/22/2018 1536      1/22/2018             Resp Rate (set): 8                Electronically Signed By: TERI Champion CRNA  January 22, 2018  3:36 PM

## 2018-01-23 NOTE — ANESTHESIA POSTPROCEDURE EVALUATION
Patient: Edmundo Guerrero    Procedure(s):  PILONIDAL CYSTECTOMY - Wound Class: II-Clean Contaminated    Diagnosis:PILONIDAL CYST  Diagnosis Additional Information: No value filed.    Anesthesia Type:  General, ETT    Note:  Anesthesia Post Evaluation    Patient location during evaluation: Phase 2, Bedside and PACU  Patient participation: Able to fully participate in evaluation  Level of consciousness: awake and alert  Pain management: adequate  Airway patency: patent  Cardiovascular status: acceptable  Respiratory status: acceptable  Hydration status: stable  PONV: none     Anesthetic complications: None          Last vitals:  Vitals:    01/22/18 1645 01/22/18 1655 01/22/18 1700   BP: 134/83 134/76 138/75   Resp: 16 16 16   Temp:      SpO2: 99% 99% 100%         Electronically Signed By: Gabo Lomax MD  January 23, 2018  10:19 AM

## 2018-01-24 LAB — COPATH REPORT: NORMAL

## 2018-01-31 ENCOUNTER — OFFICE VISIT (OUTPATIENT)
Dept: SURGERY | Facility: OTHER | Age: 23
End: 2018-01-31
Attending: SURGERY

## 2018-01-31 VITALS
DIASTOLIC BLOOD PRESSURE: 72 MMHG | BODY MASS INDEX: 25.18 KG/M2 | WEIGHT: 170 LBS | TEMPERATURE: 97.6 F | HEART RATE: 107 BPM | SYSTOLIC BLOOD PRESSURE: 130 MMHG | HEIGHT: 69 IN | OXYGEN SATURATION: 100 %

## 2018-01-31 DIAGNOSIS — Z23 NEED FOR PROPHYLACTIC VACCINATION AND INOCULATION AGAINST INFLUENZA: Primary | ICD-10-CM

## 2018-01-31 DIAGNOSIS — Z09 POSTOP CHECK: ICD-10-CM

## 2018-01-31 PROCEDURE — 90471 IMMUNIZATION ADMIN: CPT | Performed by: SURGERY

## 2018-01-31 PROCEDURE — 90686 IIV4 VACC NO PRSV 0.5 ML IM: CPT | Performed by: SURGERY

## 2018-01-31 PROCEDURE — 99024 POSTOP FOLLOW-UP VISIT: CPT | Performed by: SURGERY

## 2018-01-31 NOTE — PATIENT INSTRUCTIONS
Thank you for allowing Dr. Howard and our surgical team to participate in your care. Please call with any scheduling questions or concerns to Irene at [phone] or for nursing questions Liza [phone]

## 2018-01-31 NOTE — PROGRESS NOTES
HPI:  Returns for his first post surgical examination following pilonidal cystectomy without complaint.  Denies wound complication, fever, chills, nausea or vomiting.  ROS:   10 point ROS neg other than the symptoms noted above in the HPI.    Examination:  There were no vitals taken for this visit.    Constitutional: healthy, alert and no distress  Wound(s) healing nicely without evidence of infection. Incision is clean/dry/intact. No surrounding erythema, mild induration along incision, no drainage.    Impression:   22 year old male status post excision of pilonidal cyst and tract.  Recommendations:  Mr. Guerrero has done quite well from his pilonidal procedure. At this time there are no concerns. He can call the office on a PRN basis. All questions and concerns were addressed.     Benjamin Howard MD    1/31/2018  1:38 PM

## 2018-01-31 NOTE — PROGRESS NOTES

## 2018-01-31 NOTE — MR AVS SNAPSHOT
"              After Visit Summary   1/31/2018    Edmundo Guerrero    MRN: [id number]           Patient Information     Date Of Birth          1995        Visit Information        Provider Department      1/31/2018 2:00 PM Benjamin Howard MD [facility]        Care Instructions    Thank you for allowing Dr. Howard and our surgical team to participate in your care. Please call with any scheduling questions or concerns to Irene at [phone] or for nursing questions Liza [phone]            Follow-ups after your visit        Your next 10 appointments already scheduled     Jan 31, 2018  2:00 PM CST   (Arrive by 1:45 PM)   Post Op with Benjamin Howard MD   [facility] [place] ([facility] [place] )    [address]  [place] [id number] [phone]              Who to contact     If you have questions or need follow up information about today's clinic visit or your schedule please contact [facility] directly at [phone].  Normal or non-critical lab and imaging results will be communicated to you by MyChart, letter or phone within 4 business days after the clinic has received the results. If you do not hear from us within 7 days, please contact the clinic through [employer] or phone. If you have a critical or abnormal lab result, we will notify you by phone as soon as possible.  Submit refill requests through [employer] or call your pharmacy and they will forward the refill request to us. Please allow 3 business days for your refill to be completed.          Additional Information About Your Visit        [employer] Information     [employer] lets you send messages to your doctor, view your test results, renew your prescriptions, schedule appointments and more. To sign up, go to [email or web address] . Click on \"Log in\" on the left side of the screen, which will take you to the Welcome page. Then click on \"Sign up Now\" on the right side of the page. " "    You will be asked to enter the access code listed below, as well as some personal information. Please follow the directions to create your username and password.     Your access code is: [email or web address]  Expires: 2018  1:32 PM     Your access code will  in 90 days. If you need help or a new code, please call your [facility] clinic or [phone].        Care EveryWhere ID     This is your Care EveryWhere ID. This could be used by other organizations to access your [facility] medical records  [id number]        Your Vitals Were     Pulse Temperature Height Pulse Oximetry BMI (Body Mass Index)       107 97.6  F (36.4  C) (Tympanic) 5' 9\" (1.753 m) 100% 25.1 kg/m2        Blood Pressure from Last 3 Encounters:   18 130/72   18 138/75   18 120/64    Weight from Last 3 Encounters:   18 170 lb (77.1 kg)   18 169 lb 15.6 oz (77.1 kg)   18 170 lb (77.1 kg)              Today, you had the following     No orders found for display         Today's Medication Changes          These changes are accurate as of 18  1:45 PM.  If you have any questions, ask your nurse or doctor.               Stop taking these medicines if you haven't already. Please contact your care team if you have questions.     oxyCODONE IR 5 MG tablet   Commonly known as:  ROXICODONE   Stopped by:  Benjamin Howard MD           senna-docusate 8.6-50 MG per tablet   Commonly known as:  SENOKOT-S;PERICOLACE   Stopped by:  Benjamin Howard MD                    Primary Care Provider Office Phone # Fax #    Jayme BURGER MD Missael [phone]       [facility] [address] [place] [address]        Equal Access to Services     [facility]: Shyann Balbuena, krys sunshine, maximilian casiano . [facility] [phone].    ATENCIÓN: Si habla español, tiene a paniagua disposición servicios gratuitos de asistencia lingüística. " Cheryl acevedo [phone].    We comply with applicable federal civil rights laws and [place] laws. We do not discriminate on the basis of race, color, national origin, age, disability, sex, sexual orientation, or gender identity.            Thank you!     Thank you for choosing [facility]  for your care. Our goal is always to provide you with excellent care. Hearing back from our patients is one way we can continue to improve our services. Please take a few minutes to complete the written survey that you may receive in the mail after your visit with us. Thank you!             Your Updated Medication List - Protect others around you: Learn how to safely use, store and throw away your medicines at www.disposemymeds.org.      Notice  As of 1/31/2018  1:45 PM    You have not been prescribed any medications.

## 2018-01-31 NOTE — NURSING NOTE
"Chief Complaint   Patient presents with     Surgical Followup     s/p-pilonidal cystectomy 1/22/18       Initial /72 (BP Location: Left arm, Patient Position: Chair, Cuff Size: Adult Regular)  Pulse 107  Temp 97.6  F (36.4  C) (Tympanic)  Ht 5' 9\" (1.753 m)  Wt 170 lb (77.1 kg)  SpO2 100%  BMI 25.1 kg/m2 Estimated body mass index is 25.1 kg/(m^2) as calculated from the following:    Height as of this encounter: 5' 9\" (1.753 m).    Weight as of this encounter: 170 lb (77.1 kg).  Medication Reconciliation: brenda JAVIER      "

## 2018-07-25 ENCOUNTER — OFFICE VISIT (OUTPATIENT)
Dept: SURGERY | Facility: OTHER | Age: 23
End: 2018-07-25
Attending: SURGERY
Payer: COMMERCIAL

## 2018-07-25 VITALS
HEIGHT: 69 IN | DIASTOLIC BLOOD PRESSURE: 83 MMHG | WEIGHT: 170 LBS | OXYGEN SATURATION: 100 % | BODY MASS INDEX: 25.18 KG/M2 | HEART RATE: 87 BPM | TEMPERATURE: 96.5 F | SYSTOLIC BLOOD PRESSURE: 139 MMHG

## 2018-07-25 DIAGNOSIS — L98.8 PILONIDAL DISEASE: Primary | ICD-10-CM

## 2018-07-25 PROCEDURE — 99213 OFFICE O/P EST LOW 20 MIN: CPT | Performed by: SURGERY

## 2018-07-25 ASSESSMENT — PAIN SCALES - GENERAL: PAINLEVEL: NO PAIN (0)

## 2018-07-25 NOTE — PATIENT INSTRUCTIONS
Thank you for allowing Dr. Howard and our surgical team to participate in your care. Please call with any scheduling questions or concerns to Irene at [phone] or for nursing questions to Sonal [phone]

## 2018-07-25 NOTE — MR AVS SNAPSHOT
"              After Visit Summary   7/25/2018    Edmundo Guerrero    MRN: [id number]           Patient Information     Date Of Birth          1995        Visit Information        Provider Department      7/25/2018 10:00 AM [facility]        Today's Diagnoses     Pilonidal disease    -  1      Care Instructions    Thank you for allowing Dr. Howard and our surgical team to participate in your care. Please call with any scheduling questions or concerns to Irene at [phone] or for nursing questions to Sonal [phone]            Follow-ups after your visit        Who to contact     If you have questions or need follow up information about today's clinic visit or your schedule please contact [facility] directly at [phone].  Normal or non-critical lab and imaging results will be communicated to you by MyChart, letter or phone within 4 business days after the clinic has received the results. If you do not hear from us within 7 days, please contact the clinic through MyChart or phone. If you have a critical or abnormal lab result, we will notify you by phone as soon as possible.  Submit refill requests through [employer] or call your pharmacy and they will forward the refill request to us. Please allow 3 business days for your refill to be completed.          Additional Information About Your Visit        Care EveryWhere ID     This is your Care EveryWhere ID. This could be used by other organizations to access your [facility] medical records  [id number]        Your Vitals Were     Pulse Temperature Height Pulse Oximetry BMI (Body Mass Index)       87 96.5  F (35.8  C) (Tympanic) 5' 9\" (1.753 m) 100% 25.1 kg/m2        Blood Pressure from Last 3 Encounters:   07/25/18 139/83   01/31/18 130/72   01/22/18 138/75    Weight from Last 3 Encounters:   07/25/18 170 lb (77.1 kg)   01/31/18 170 lb (77.1 kg)   01/22/18 169 lb 15.6 oz (77.1 kg)              Today, " you had the following     No orders found for display       Primary Care Provider Office Phone # Fax #    Jayme JENNYFER Canas [phone]       [facility] [address] [address]        Equal Access to Services     [facility] : Hadii denae del rio hadbessyo Soomaali, waaxda luqadaha, qaybta kaalmada adeegyada, maximilian dubois leolachristine gantcrescencioliliana bolaños . So [facility] [phone].    ATENCIÓN: Si habla español, tiene a paniagua disposición servicios gratuitos de asistencia lingüística. Llame al [phone].    We comply with applicable federal civil rights laws and [place] laws. We do not discriminate on the basis of race, color, national origin, age, disability, sex, sexual orientation, or gender identity.            Thank you!     Thank you for choosing [facility]  for your care. Our goal is always to provide you with excellent care. Hearing back from our patients is one way we can continue to improve our services. Please take a few minutes to complete the written survey that you may receive in the mail after your visit with us. Thank you!             Your Updated Medication List - Protect others around you: Learn how to safely use, store and throw away your medicines at www.disposemymeds.org.      Notice  As of 7/25/2018 10:08 AM    You have not been prescribed any medications.

## 2018-07-25 NOTE — PROGRESS NOTES
"SUBJECTIVE:  Mr. Guerrero returns with concerns about recurrent pilonidal disease. He says that while he was gone on his time away this past spring he was doing good and then this summer he noticed some increased pain. He then had some bloody drainage near his anus. He says the discomfort he had was not as severe as it was previously. He was concerned that it may get worse when he is sitting down and throughout this next semester. He wanted to be evaluated.     During his previous cystectomy there was a sinus/pit adjacent to the anus that was no included in the resection due to healing concerns and his up and coming trip being planned. At that time it was discussed that he may develop further symptoms but we could address them as they came up.    OBJECTIVE:  /83  Pulse 87  Temp 96.5  F (35.8  C) (Tympanic)  Ht 5' 9\" (1.753 m)  Wt 170 lb (77.1 kg)  SpO2 100%  BMI 25.1 kg/m2    Gen: AAA, NAD  Perineum: gluteal cleft deep with significant hair over gluteal cheeks and cleft. Previous well healed incision line with small pilonidal pit at superior aspect and the another large pit adjacent to the anus with hair. No surrounding erythema to pits or along previous incision. There is no fluctuance    ASSESSMENT/PLAN:  21 yo male with pilonidal disease    As he has ongoing disease with minimal symptoms I recommended conservative treatment with observation. I suggested good hygiene and shaving of the area to keep it clean and minimize risk of infection. If he does develop infection of his pilonidal sinus/pits he may need antibiotics and then possibly a further operative intervention with cleft lift procedure. All questions and concerns were addressed. We will proceed with observation for now and if he develops worsening symptoms this semester he can come in for an evaluation and possible scheduling of an elective procedure.     Benjamin Howard  7/25/2018      "

## 2018-07-25 NOTE — NURSING NOTE
"Chief Complaint   Patient presents with     Follow Up For     pilonidal cyst       Initial /83  Pulse 87  Temp 96.5  F (35.8  C) (Tympanic)  Ht 5' 9\" (1.753 m)  Wt 170 lb (77.1 kg)  SpO2 100%  BMI 25.1 kg/m2 Estimated body mass index is 25.1 kg/(m^2) as calculated from the following:    Height as of this encounter: 5' 9\" (1.753 m).    Weight as of this encounter: 170 lb (77.1 kg).  Medication Reconciliation: complete    Darline Bocanegra LPN    "

## 2020-01-17 ENCOUNTER — OFFICE VISIT (OUTPATIENT)
Dept: SURGERY | Facility: OTHER | Age: 25
End: 2020-01-17
Attending: SURGERY
Payer: COMMERCIAL

## 2020-01-17 VITALS
OXYGEN SATURATION: 100 % | BODY MASS INDEX: 23.11 KG/M2 | TEMPERATURE: 98.1 F | SYSTOLIC BLOOD PRESSURE: 146 MMHG | DIASTOLIC BLOOD PRESSURE: 72 MMHG | WEIGHT: 156 LBS | HEIGHT: 69 IN | HEART RATE: 74 BPM

## 2020-01-17 DIAGNOSIS — L98.8 PILONIDAL DISEASE: Primary | ICD-10-CM

## 2020-01-17 PROCEDURE — 99213 OFFICE O/P EST LOW 20 MIN: CPT | Performed by: SURGERY

## 2020-01-17 ASSESSMENT — MIFFLIN-ST. JEOR: SCORE: 1697.99

## 2020-01-17 ASSESSMENT — PAIN SCALES - GENERAL: PAINLEVEL: NO PAIN (0)

## 2020-01-17 NOTE — NURSING NOTE
"Chief Complaint   Patient presents with     RECHECK     pilonidal cyst. Last seen 1/2018. Started bleeding daily in December. Now draining.       Initial BP (!) 146/72   Pulse 74   Temp 98.1  F (36.7  C)   Ht 1.753 m (5' 9\")   Wt 70.8 kg (156 lb)   SpO2 100%   BMI 23.04 kg/m   Estimated body mass index is 23.04 kg/m  as calculated from the following:    Height as of this encounter: 1.753 m (5' 9\").    Weight as of this encounter: 70.8 kg (156 lb).  Medication Reconciliation: complete  SOPHY COUGHLIN LPN    "

## 2020-01-17 NOTE — PATIENT INSTRUCTIONS
Thank you for allowing Dr. Howard and our surgical team to participate in your care. Please call our health unit coordinator at [phone] with scheduling questions or the nurse at [phone] with any other questions or concerns.

## 2020-01-18 NOTE — PROGRESS NOTES
"SUBJECTIVE:  Mr Guerrero presents to discuss ongoing issues with his pilonidal disease. He has undergone previous excision and due to travel obligations the pits adjacent to his anus were not removed. He did well for over a year and then had some more pain and bleeding. He was seen at that time and treated conservatively. He now returns with another issues. He says that he has noticed blood on his stools and when he will wipe. He has no pain with bowel movements. He has no lower pelvic pain or discomfort. He would like to discuss treatment to greatly reduce his pilonidal disease.    OBJECTIVE:  BP (!) 146/72   Pulse 74   Temp 98.1  F (36.7  C)   Ht 1.753 m (5' 9\")   Wt 70.8 kg (156 lb)   SpO2 100%   BMI 23.04 kg/m      Gen: AAA, NAD  Chest: CTAB  Card: RRR  : along gluteal cleft 5-6 cm from anus there is an open pilonidal pit/sinus. It is 1 cm in length. The sinus does not tract towards the anus. The gluteal cleft is very deep and there is extensive hair in the cleft.     ASSESSMENT/PLAN:  23 yo male with pilonidal disease      Mr Guerrero would like to undergo treatment to eliminate and further episodes. I discussed that a lift procedure would help to greatly reduce his disease recurrence. However, he has undergone one procedure and his disease at this time is mild. He would like to have this done as he is entering dental school and does not want any interruptions due to this. I discussed that I will discuss with colleagues prior to proceeding with a lift procedure. He would like to try an arrange having a procedure performed in May after he graduates. All questions and concerns were addressed with adequate time spent answering all concerns.      Benjamin Howard MD      "

## 2020-02-10 ENCOUNTER — TELEPHONE (OUTPATIENT)
Dept: SURGERY | Facility: OTHER | Age: 25
End: 2020-02-10

## 2020-02-10 NOTE — TELEPHONE ENCOUNTER
Edmundo is wondering if Dr Howard has spoken to another surgeon about his pilonidal cyst he hasn't heard anything on this.

## 2020-02-12 NOTE — TELEPHONE ENCOUNTER
Please let the patient know that I have discussed with another surgeon and I would like to discuss with him when he is back in town. The timing of a procedure and the start of school may be prohibitive and that is why I would like to discuss with him.

## 2020-02-13 NOTE — TELEPHONE ENCOUNTER
"Discussed with patient. He will call ahead when he knows he will be back in the area to make appointment.    He also said that it was discussed that he may need to see a specialist, because Dr. Howard was \"on the fence\" about whether or not he would do surgery. He wants to know if Dr. Howard is still thinking he should see a specialist. He says he will go see a specialist if needed.   "

## 2020-03-06 ENCOUNTER — OFFICE VISIT (OUTPATIENT)
Dept: SURGERY | Facility: OTHER | Age: 25
End: 2020-03-06
Attending: SURGERY
Payer: COMMERCIAL

## 2020-03-06 VITALS
WEIGHT: 152 LBS | TEMPERATURE: 96.8 F | HEART RATE: 82 BPM | HEIGHT: 69 IN | BODY MASS INDEX: 22.51 KG/M2 | DIASTOLIC BLOOD PRESSURE: 70 MMHG | SYSTOLIC BLOOD PRESSURE: 130 MMHG | OXYGEN SATURATION: 100 %

## 2020-03-06 DIAGNOSIS — L98.8 PILONIDAL DISEASE: Primary | ICD-10-CM

## 2020-03-06 PROCEDURE — 99212 OFFICE O/P EST SF 10 MIN: CPT | Performed by: SURGERY

## 2020-03-06 ASSESSMENT — MIFFLIN-ST. JEOR: SCORE: 1679.85

## 2020-03-06 ASSESSMENT — PAIN SCALES - GENERAL: PAINLEVEL: NO PAIN (0)

## 2020-03-06 NOTE — NURSING NOTE
"Chief Complaint   Patient presents with     Consult For     Pilonidal cyst       Initial /70 (BP Location: Right arm, Patient Position: Chair, Cuff Size: Adult Regular)   Pulse 82   Temp 96.8  F (36  C) (Tympanic)   Ht 1.753 m (5' 9\")   Wt 68.9 kg (152 lb)   SpO2 100%   BMI 22.45 kg/m   Estimated body mass index is 22.45 kg/m  as calculated from the following:    Height as of this encounter: 1.753 m (5' 9\").    Weight as of this encounter: 68.9 kg (152 lb).  Medication Reconciliation: complete  JAYDEN PATEL LPN    "

## 2020-03-07 NOTE — PROGRESS NOTES
"SUBJECTIVE:  Mr Guerrero presents for discussion of treatment of his pilonidal disease.  He has had no pain or discomfort along his sacrum or intergluteal cleft.  He has had no purulent drainage.  He does state that he has had a little bit of bloody drainage while he wipes.  However the area is nontender.    OBJECTIVE:  /70 (BP Location: Right arm, Patient Position: Chair, Cuff Size: Adult Regular)   Pulse 82   Temp 96.8  F (36  C) (Tympanic)   Ht 1.753 m (5' 9\")   Wt 68.9 kg (152 lb)   SpO2 100%   BMI 22.45 kg/m      General: Awake alert oriented and in no acute distress    ASSESSMENT/PLAN:  24-year-old gentleman with mild pilonidal disease      This visit was consultative in its entirety.  I discussed with the patient that after discussion with other surgeons and discussing his case it was a consensus to proceed forward with simple excision and off midline closure.  I discussed that as he has very mild pilonidal disease performing a complex flap reconstruction would be of no benefit.  As he has had excellent results with the previous offline closure and excision of his previous cyst and pits I suspect that he would have the same results with this procedure.  It was discussed that this pit that was seen on the last examine was there along his previous procedure.  However at that time due to his trip that he had planned it was not excised due to its proximity to the anus.  The patient would like to discuss with his family about plans for proceeding with possible treatment of his mild pilonidal disease.  I discussed that I am unaware or unsure when or if he may ever develop symptoms from the pit that he has at this point.  However, as he has had excellent results with the previous excision and closure I would recommend that he undergoes that at this point in time.  All question concerns were addressed he will call our office for potential scheduling of excision of his pilonidal disease.      Benjamin HSU. " MD Anita

## 2020-03-12 ENCOUNTER — TELEPHONE (OUTPATIENT)
Dept: SURGERY | Facility: OTHER | Age: 25
End: 2020-03-12

## 2020-03-12 NOTE — TELEPHONE ENCOUNTER
This patient called to schedule surgery for pilonidal cyst in May. I will call him to arrange a date.    Need name of procedure. Does he need a preop.

## 2020-03-12 NOTE — LETTER
March 20, 2020          Edmundo Guerrero  [address] [place] [address]      Dear Edmundo,       Thank you for allowing our surgical team to participate in your care. Please review the instructions below.   If you have questions, you may contact us at the any of the following numbers:     [facility] Unit Coordinator: [phone]  Clinic Surgery Nurse (Sonal): [phone]  Hospital Surgery Education Nurse: [phone]    You are scheduled for: Pilondial Cyst Excision with Dr. Howard on 5/18/2020  Admit Time: Hospital Surgery will call you the day before your procedure by 5pm with your arrival time.   If your surgery is on Monday, expect a call on Friday.   If you are not contacted before 5PM, please call admitting at [phone].   After hours or on weekends, please call [phone] to postpone.     Call the clinic surgery nurse if you become ill within 1-2 weeks of your procedure to reschedule.   This includes fever, chills, sore throat, cough, chest congestion, runny nose, or any other symptom of any other illness.     Please call the Hospital Surgery Education Nurse at [phone] 1-2 weeks prior to your procedure and have a medication list ready.     Do not take Aspirin or other NSAIDS (Ibuprofen, Motrin, Aleve, Celebrex, Naproxen, etc), vitamins/supplements 7 days before your surgery unless you have been otherwise directed.     Shower the night before and the morning of your procedure with Chlorhexidine/Hibiclens soap.  On The Night Before Your Surgery:  1.  Remove your jewelry and leave off until after surgery. Wash your hair and body with your regular soap/shampoo. Use a freshly washed washcloth. Rinse off soap/shampoo.  2. Wash your body from neck to feet using 1/2 of the 1st Hibiclens (Chlorhexidine) bottle with your bare hands. Do not use the Hibiclens on your face, hair or genital area. Leave the soap on your body for one minute and rinse.  3. Repeat step 2 with the 2nd half of the  bottle.  4. Rinse off all soap and dry with a freshly washed towel. Do not use lotions or hair products.  5. Sleep in freshly washed pajamas and freshly washed bedding.  On The Morning of Your Surgery:  1.Wash your hair and body with your regular soap/shampoo. Use another freshly washed washcloth. Rinse off.   2. Wash your body from neck to feet using 1/2 of the 2nd Hibiclens (Chlorhexidine) bottle with your bare hands. Leave the soap on your body for one minute and rinse.  3. Repeat step 2 with the 2nd half of the bottle.  4. Rinse off all the soap and dry with another freshly washed towel. Do not use lotions or hair products.  5. Wear freshly washed clothing to the hospital.    Do not have anything to eat or drink after midnight the night before your surgery (or 8 hours prior to surgery), except clear liquids (water, clear juice, clear broth, plain coffee or tea without cream or milk) up until 2 hours prior to arrival time.   Nothing by mouth after the 2 hours prior to arrival.  Do not chew gum, suck on hard candy, or smoke. You can brush your teeth.   If you are directed to take any medications, take them with a tiny sip of water.   If you use an inhaler, bring it with you.    Arrive in clean, comfortable clothing.   Do not wear any jewelry, make-up, nail polish, lotions, hair products, or perfumes.   [place] in hospital admitting through the [facility].    A responsible adult must be available to drive you home and stay with you for 24 hours at home. If you need to take a taxi or the bus, you must have another responsible adult to ride with you. Your procedure will be cancelled if you do not have a responsible adult .       Sincerely,        Benjamin Howard MD/Sonal SCHNEIDER LPN

## 2020-03-12 NOTE — TELEPHONE ENCOUNTER
Calling to set up a surgery with Dr Howard for a pilonidal cyst removal. He is looking at Tuesday May 19 that would be the ideal day or anytime during that week will work for him.

## 2020-03-20 ENCOUNTER — PREP FOR PROCEDURE (OUTPATIENT)
Dept: SURGERY | Facility: OTHER | Age: 25
End: 2020-03-20

## 2020-03-20 DIAGNOSIS — L98.8 PILONIDAL DISEASE: Primary | ICD-10-CM

## 2020-03-20 NOTE — TELEPHONE ENCOUNTER
"Discussed with Dr. Howard.    \"The procedure would a pilonidal cyst excision, reason pilonidal disease\"    Patient scheduled for 5/18/2020. He is aware that this may need to be rescheduled when gets closer due to date due to COVID-19 pandemic.   "

## 2020-03-21 PROBLEM — L98.8 PILONIDAL DISEASE: Status: ACTIVE | Noted: 2020-03-21

## 2020-03-21 RX ORDER — ACETAMINOPHEN 325 MG/1
975 TABLET ORAL ONCE
Status: CANCELLED | OUTPATIENT
Start: 2020-03-21 | End: 2020-03-21

## 2020-03-21 RX ORDER — CEFOTETAN AND DEXTROSE 1 G/50ML
1 INJECTION, SOLUTION INTRAVENOUS
Status: CANCELLED | OUTPATIENT
Start: 2020-03-21

## 2020-03-21 RX ORDER — CEFOTETAN AND DEXTROSE 1 G/50ML
1 INJECTION, SOLUTION INTRAVENOUS SEE ADMIN INSTRUCTIONS
Status: CANCELLED | OUTPATIENT
Start: 2020-03-21

## 2020-04-13 ENCOUNTER — TELEPHONE (OUTPATIENT)
Dept: SURGERY | Facility: OTHER | Age: 25
End: 2020-04-13

## 2020-04-13 NOTE — TELEPHONE ENCOUNTER
10:03 AM    Reason for Call: Phone Call    Description: Patient is wondering if his surgery with Anita will still be on the date that is scheduled due to elective surgeries being postponed and cancelled.    Was an appointment offered for this call? No  If yes : Appointment type              Date    Preferred method for responding to this message: Telephone Call  What is your phone number ?    [phone]     If we cannot reach you directly, may we leave a detailed response at the number you provided? Yes    Can this message wait until your PCP/provider returns, if available today? YES    Aline Wade

## 2020-05-06 ENCOUNTER — TELEPHONE (OUTPATIENT)
Dept: SURGERY | Facility: OTHER | Age: 25
End: 2020-05-06

## 2020-05-06 NOTE — TELEPHONE ENCOUNTER
Patient is wanting to know if he is still having his surgery on 5/18/20 or if it will be pushed out please call [phone]

## 2020-05-07 NOTE — TELEPHONE ENCOUNTER
Patient notified of need to change his date for his procedure.  The patient has chosen Wednesday, Maria De Jesus 3, 2020 as the new surgery date with Dr Howard.  Patient was told he will need a covid-19 test 72 hours prior to his procedure but declined to schedule it at this time due to the possibility of having to change his date again due to his schedule.  Patient was given the surgery department phone number to call to schedule when he knows if this surgery date will work with his schedule.  SAHIL JAVIER LPN

## 2020-05-19 ENCOUNTER — TELEPHONE (OUTPATIENT)
Dept: SURGERY | Facility: OTHER | Age: 25
End: 2020-05-19

## 2020-05-19 NOTE — TELEPHONE ENCOUNTER
Talked to patient and he would like to change his procedure date from  6/2 to 6/16. Covid screening was not available for 6/13 so scheduled screening on 6/14  Patient was also aware that the OR dates are subject to change once we get back to our normal operating days. Patient verbalized understanding this.       OR notified of this change  Label in the book

## 2020-06-09 ENCOUNTER — TELEPHONE (OUTPATIENT)
Dept: SURGERY | Facility: OTHER | Age: 25
End: 2020-06-09

## 2020-06-09 ENCOUNTER — ANESTHESIA EVENT (OUTPATIENT)
Dept: SURGERY | Facility: HOSPITAL | Age: 25
End: 2020-06-09
Payer: COMMERCIAL

## 2020-06-09 RX ORDER — NALOXONE HYDROCHLORIDE 0.4 MG/ML
.1-.4 INJECTION, SOLUTION INTRAMUSCULAR; INTRAVENOUS; SUBCUTANEOUS
Status: CANCELLED | OUTPATIENT
Start: 2020-06-09 | End: 2020-06-10

## 2020-06-09 RX ORDER — SODIUM CHLORIDE, SODIUM LACTATE, POTASSIUM CHLORIDE, CALCIUM CHLORIDE 600; 310; 30; 20 MG/100ML; MG/100ML; MG/100ML; MG/100ML
INJECTION, SOLUTION INTRAVENOUS CONTINUOUS
Status: CANCELLED | OUTPATIENT
Start: 2020-06-09

## 2020-06-09 RX ORDER — ONDANSETRON 4 MG/1
4 TABLET, ORALLY DISINTEGRATING ORAL EVERY 30 MIN PRN
Status: CANCELLED | OUTPATIENT
Start: 2020-06-09

## 2020-06-09 RX ORDER — HYDROMORPHONE HYDROCHLORIDE 1 MG/ML
.3-.5 INJECTION, SOLUTION INTRAMUSCULAR; INTRAVENOUS; SUBCUTANEOUS EVERY 10 MIN PRN
Status: CANCELLED | OUTPATIENT
Start: 2020-06-09

## 2020-06-09 RX ORDER — LIDOCAINE 40 MG/G
CREAM TOPICAL
Status: CANCELLED | OUTPATIENT
Start: 2020-06-09

## 2020-06-09 RX ORDER — LABETALOL 20 MG/4 ML (5 MG/ML) INTRAVENOUS SYRINGE
10
Status: CANCELLED | OUTPATIENT
Start: 2020-06-09

## 2020-06-09 RX ORDER — ONDANSETRON 2 MG/ML
4 INJECTION INTRAMUSCULAR; INTRAVENOUS EVERY 30 MIN PRN
Status: CANCELLED | OUTPATIENT
Start: 2020-06-09

## 2020-06-09 RX ORDER — FENTANYL CITRATE 50 UG/ML
25-50 INJECTION, SOLUTION INTRAMUSCULAR; INTRAVENOUS
Status: CANCELLED | OUTPATIENT
Start: 2020-06-09

## 2020-06-09 RX ORDER — MEPERIDINE HYDROCHLORIDE 25 MG/ML
12.5 INJECTION INTRAMUSCULAR; INTRAVENOUS; SUBCUTANEOUS
Status: CANCELLED | OUTPATIENT
Start: 2020-06-09

## 2020-06-09 RX ORDER — FENTANYL CITRATE 50 UG/ML
25-50 INJECTION, SOLUTION INTRAMUSCULAR; INTRAVENOUS EVERY 5 MIN PRN
Status: CANCELLED | OUTPATIENT
Start: 2020-06-09

## 2020-06-09 NOTE — ANESTHESIA PREPROCEDURE EVALUATION
Anesthesia Pre-Procedure Evaluation    Patient: Edmundo Guerrero   MRN: [id number] : 1995          Preoperative Diagnosis: Pilonidal disease [L98.8]    Procedure(s):  pilonidal cyst excision    No past medical history on file.  Past Surgical History:   Procedure Laterality Date     CYSTECTOMY PILONIDAL N/A 2018    Procedure: CYSTECTOMY PILONIDAL;  PILONIDAL CYSTECTOMY;  Surgeon: Benjamin Howard MD;  Location: [facility] OR       Anesthesia Evaluation     . Pt has had prior anesthetic. Type: General    No history of anesthetic complications          ROS/MED HX    ENT/Pulmonary:     (+)allergic rhinitis, Intermittent asthma Last exacerbation: years,Treatment: Inhaler prn,  , . .    Neurologic:  - neg neurologic ROS     Cardiovascular:  - neg cardiovascular ROS   (+) ----. : . . . :. . No previous cardiac testing       METS/Exercise Tolerance:  >4 METS   Hematologic:  - neg hematologic  ROS       Musculoskeletal:  - neg musculoskeletal ROS       GI/Hepatic:  - neg GI/hepatic ROS       Renal/Genitourinary:  - ROS Renal section negative       Endo:  - neg endo ROS       Psychiatric:  - neg psychiatric ROS       Infectious Disease:  - neg infectious disease ROS       Malignancy:      - no malignancy   Other: Comment: Pilonidal disease                         Physical Exam  Normal systems: cardiovascular, pulmonary and dental    Airway   Mallampati: II  TM distance: >3 FB  Neck ROM: full    Dental     Cardiovascular   Rhythm and rate: regular and normal      Pulmonary    breath sounds clear to auscultation            No results found for: WBC, HGB, HCT, PLT, CRP, SED, NA, POTASSIUM, CHLORIDE, CO2, BUN, CR, GLC, ADIN, PHOS, MAG, ALBUMIN, PROTTOTAL, ALT, AST, GGT, ALKPHOS, BILITOTAL, BILIDIRECT, LIPASE, AMYLASE, SYLVIA, PTT, INR, FIBR, TSH, T4, T3, HCG, HCGS, CKTOTAL, CKMB, TROPN    Preop Vitals  BP Readings from Last 3 Encounters:   20 130/70   20 (!) 146/72   18 139/83    Pulse Readings from Last  "3 Encounters:   03/06/20 82   01/17/20 74   07/25/18 87      Resp Readings from Last 3 Encounters:   01/22/18 16    SpO2 Readings from Last 3 Encounters:   03/06/20 100%   01/17/20 100%   07/25/18 100%      Temp Readings from Last 1 Encounters:   03/06/20 96.8  F (36  C) (Tympanic)    Ht Readings from Last 1 Encounters:   03/06/20 1.753 m (5' 9\")      Wt Readings from Last 1 Encounters:   03/06/20 68.9 kg (152 lb)    Estimated body mass index is 22.45 kg/m  as calculated from the following:    Height as of 3/6/20: 1.753 m (5' 9\").    Weight as of 3/6/20: 68.9 kg (152 lb).       Anesthesia Plan      History & Physical Review  History and physical reviewed and following examination; no interval change.    ASA Status:  2 .    NPO Status:  > 8 hours    Plan for General with Intravenous and Propofol induction. Maintenance will be Inhalation.    PONV prophylaxis:  Ondansetron (or other 5HT-3) and Dexamethasone or Solumedrol  Discussed risks and benefits with patient for general anesthesia including sore throat, nausea, vomiting, aspiration, dental damage, loss of airway, CV complications, stroke, MI, death. Pt wishes to proceed.         Postoperative Care  Postoperative pain management:  IV analgesics.      Consents  Anesthetic plan, risks, benefits and alternatives discussed with:  Patient..                 TERI Champion CRNA  "

## 2020-06-09 NOTE — TELEPHONE ENCOUNTER
Talked to patient and rescheduled procedure to the following week 6/23. Covid screening set for 6/20  OR notified

## 2020-06-09 NOTE — TELEPHONE ENCOUNTER
Patient states he got a message he needs to have his Covid testing done on 06/13. He [place] from the cities on [place]. He is wondering if he can get this done at a [facility] down there as he will not be able to get here by the required time.

## 2020-06-16 ENCOUNTER — ANESTHESIA (OUTPATIENT)
Dept: SURGERY | Facility: HOSPITAL | Age: 25
End: 2020-06-16
Payer: COMMERCIAL

## 2020-06-16 RX ORDER — MULTIPLE VITAMINS W/ MINERALS TAB 9MG-400MCG
1 TAB ORAL DAILY
COMMUNITY

## 2020-06-20 ENCOUNTER — OFFICE VISIT (OUTPATIENT)
Dept: FAMILY MEDICINE | Facility: OTHER | Age: 25
End: 2020-06-20
Attending: FAMILY MEDICINE
Payer: COMMERCIAL

## 2020-06-20 DIAGNOSIS — Z01.818 PREOP GENERAL PHYSICAL EXAM: Primary | ICD-10-CM

## 2020-06-20 PROCEDURE — 99000 SPECIMEN HANDLING OFFICE-LAB: CPT | Performed by: FAMILY MEDICINE

## 2020-06-20 PROCEDURE — U0003 INFECTIOUS AGENT DETECTION BY NUCLEIC ACID (DNA OR RNA); SEVERE ACUTE RESPIRATORY SYNDROME CORONAVIRUS 2 (SARS-COV-2) (CORONAVIRUS DISEASE [COVID-19]), AMPLIFIED PROBE TECHNIQUE, MAKING USE OF HIGH THROUGHPUT TECHNOLOGIES AS DESCRIBED BY CMS-2020-01-R: HCPCS | Mod: 90 | Performed by: FAMILY MEDICINE

## 2020-06-21 LAB
SARS-COV-2 RNA SPEC QL NAA+PROBE: NOT DETECTED
SPECIMEN SOURCE: NORMAL

## 2020-06-23 ENCOUNTER — HOSPITAL ENCOUNTER (OUTPATIENT)
Facility: HOSPITAL | Age: 25
Discharge: HOME | End: 2020-06-23
Attending: SURGERY | Admitting: SURGERY
Payer: COMMERCIAL

## 2020-06-23 VITALS
WEIGHT: 153 LBS | HEART RATE: 82 BPM | RESPIRATION RATE: 16 BRPM | SYSTOLIC BLOOD PRESSURE: 140 MMHG | TEMPERATURE: 98.3 F | DIASTOLIC BLOOD PRESSURE: 99 MMHG | OXYGEN SATURATION: 100 % | HEIGHT: 69 IN | BODY MASS INDEX: 22.66 KG/M2

## 2020-06-23 DIAGNOSIS — L98.8 PILONIDAL DISEASE: ICD-10-CM

## 2020-06-23 PROCEDURE — 25000125 ZZHC RX 250: Performed by: NURSE ANESTHETIST, CERTIFIED REGISTERED

## 2020-06-23 PROCEDURE — 37000008 ZZH ANESTHESIA TECHNICAL FEE, 1ST 30 MIN: Performed by: SURGERY

## 2020-06-23 PROCEDURE — 37000009 ZZH ANESTHESIA TECHNICAL FEE, EACH ADDTL 15 MIN: Performed by: SURGERY

## 2020-06-23 PROCEDURE — 25000125 ZZHC RX 250: Performed by: SURGERY

## 2020-06-23 PROCEDURE — 88304 TISSUE EXAM BY PATHOLOGIST: CPT | Mod: TC | Performed by: SURGERY

## 2020-06-23 PROCEDURE — 27110028 ZZH OR GENERAL SUPPLY NON-STERILE: Performed by: SURGERY

## 2020-06-23 PROCEDURE — 11771 EXC PILONIDAL CYST XTNSV: CPT | Performed by: NURSE ANESTHETIST, CERTIFIED REGISTERED

## 2020-06-23 PROCEDURE — 25000566 ZZH SEVOFLURANE, EA 15 MIN: Performed by: NURSE ANESTHETIST, CERTIFIED REGISTERED

## 2020-06-23 PROCEDURE — 71000014 ZZH RECOVERY PHASE 1 LEVEL 2 FIRST HR: Performed by: SURGERY

## 2020-06-23 PROCEDURE — 71000027 ZZH RECOVERY PHASE 2 EACH 15 MINS: Performed by: SURGERY

## 2020-06-23 PROCEDURE — 27210794 ZZH OR GENERAL SUPPLY STERILE: Performed by: SURGERY

## 2020-06-23 PROCEDURE — 36000052 ZZH SURGERY LEVEL 2 EA 15 ADDTL MIN: Performed by: SURGERY

## 2020-06-23 PROCEDURE — 36000050 ZZH SURGERY LEVEL 2 1ST 30 MIN: Performed by: SURGERY

## 2020-06-23 PROCEDURE — 40000306 ZZH STATISTIC PRE PROC ASSESS II: Performed by: SURGERY

## 2020-06-23 PROCEDURE — 25000128 H RX IP 250 OP 636: Performed by: NURSE ANESTHETIST, CERTIFIED REGISTERED

## 2020-06-23 PROCEDURE — 11770 EXC PILONIDAL CYST SIMPLE: CPT | Performed by: SURGERY

## 2020-06-23 PROCEDURE — 25800030 ZZH RX IP 258 OP 636: Performed by: NURSE ANESTHETIST, CERTIFIED REGISTERED

## 2020-06-23 PROCEDURE — 25000132 ZZH RX MED GY IP 250 OP 250 PS 637: Performed by: SURGERY

## 2020-06-23 RX ORDER — LABETALOL 20 MG/4 ML (5 MG/ML) INTRAVENOUS SYRINGE
10
Status: DISCONTINUED | OUTPATIENT
Start: 2020-06-23 | End: 2020-06-23 | Stop reason: HOSPADM

## 2020-06-23 RX ORDER — ONDANSETRON 2 MG/ML
INJECTION INTRAMUSCULAR; INTRAVENOUS PRN
Status: DISCONTINUED | OUTPATIENT
Start: 2020-06-23 | End: 2020-06-23

## 2020-06-23 RX ORDER — AMOXICILLIN 250 MG
1-2 CAPSULE ORAL 2 TIMES DAILY
Qty: 30 TABLET | Refills: 0 | Status: SHIPPED | OUTPATIENT
Start: 2020-06-23 | End: 2020-07-15

## 2020-06-23 RX ORDER — ONDANSETRON 4 MG/1
4 TABLET, ORALLY DISINTEGRATING ORAL
Status: DISCONTINUED | OUTPATIENT
Start: 2020-06-23 | End: 2020-06-23 | Stop reason: HOSPADM

## 2020-06-23 RX ORDER — MEPERIDINE HYDROCHLORIDE 25 MG/ML
12.5 INJECTION INTRAMUSCULAR; INTRAVENOUS; SUBCUTANEOUS
Status: DISCONTINUED | OUTPATIENT
Start: 2020-06-23 | End: 2020-06-23 | Stop reason: HOSPADM

## 2020-06-23 RX ORDER — FENTANYL CITRATE 50 UG/ML
25-50 INJECTION, SOLUTION INTRAMUSCULAR; INTRAVENOUS
Status: DISCONTINUED | OUTPATIENT
Start: 2020-06-23 | End: 2020-06-23 | Stop reason: HOSPADM

## 2020-06-23 RX ORDER — KETOROLAC TROMETHAMINE 30 MG/ML
INJECTION, SOLUTION INTRAMUSCULAR; INTRAVENOUS PRN
Status: DISCONTINUED | OUTPATIENT
Start: 2020-06-23 | End: 2020-06-23

## 2020-06-23 RX ORDER — LIDOCAINE HYDROCHLORIDE 20 MG/ML
INJECTION, SOLUTION INFILTRATION; PERINEURAL PRN
Status: DISCONTINUED | OUTPATIENT
Start: 2020-06-23 | End: 2020-06-23

## 2020-06-23 RX ORDER — LIDOCAINE 40 MG/G
CREAM TOPICAL
Status: DISCONTINUED | OUTPATIENT
Start: 2020-06-23 | End: 2020-06-23 | Stop reason: HOSPADM

## 2020-06-23 RX ORDER — NALOXONE HYDROCHLORIDE 0.4 MG/ML
.1-.4 INJECTION, SOLUTION INTRAMUSCULAR; INTRAVENOUS; SUBCUTANEOUS
Status: DISCONTINUED | OUTPATIENT
Start: 2020-06-23 | End: 2020-06-23 | Stop reason: HOSPADM

## 2020-06-23 RX ORDER — FENTANYL CITRATE 50 UG/ML
INJECTION, SOLUTION INTRAMUSCULAR; INTRAVENOUS PRN
Status: DISCONTINUED | OUTPATIENT
Start: 2020-06-23 | End: 2020-06-23

## 2020-06-23 RX ORDER — SODIUM CHLORIDE, SODIUM LACTATE, POTASSIUM CHLORIDE, CALCIUM CHLORIDE 600; 310; 30; 20 MG/100ML; MG/100ML; MG/100ML; MG/100ML
INJECTION, SOLUTION INTRAVENOUS CONTINUOUS
Status: DISCONTINUED | OUTPATIENT
Start: 2020-06-23 | End: 2020-06-23 | Stop reason: HOSPADM

## 2020-06-23 RX ORDER — OXYCODONE HYDROCHLORIDE 5 MG/1
5-10 TABLET ORAL
Status: COMPLETED | OUTPATIENT
Start: 2020-06-23 | End: 2020-06-23

## 2020-06-23 RX ORDER — BUPIVACAINE HYDROCHLORIDE AND EPINEPHRINE 2.5; 5 MG/ML; UG/ML
INJECTION, SOLUTION INFILTRATION; PERINEURAL PRN
Status: DISCONTINUED | OUTPATIENT
Start: 2020-06-23 | End: 2020-06-23 | Stop reason: HOSPADM

## 2020-06-23 RX ORDER — OXYCODONE HYDROCHLORIDE 5 MG/1
5-10 TABLET ORAL EVERY 6 HOURS PRN
Qty: 30 TABLET | Refills: 0 | Status: SHIPPED | OUTPATIENT
Start: 2020-06-23 | End: 2020-07-15

## 2020-06-23 RX ORDER — PROPOFOL 10 MG/ML
INJECTION, EMULSION INTRAVENOUS PRN
Status: DISCONTINUED | OUTPATIENT
Start: 2020-06-23 | End: 2020-06-23

## 2020-06-23 RX ORDER — CEFOTETAN AND DEXTROSE 1 G/50ML
1 INJECTION, SOLUTION INTRAVENOUS SEE ADMIN INSTRUCTIONS
Status: DISCONTINUED | OUTPATIENT
Start: 2020-06-23 | End: 2020-06-23 | Stop reason: HOSPADM

## 2020-06-23 RX ORDER — ACETAMINOPHEN 325 MG/1
975 TABLET ORAL ONCE
Status: COMPLETED | OUTPATIENT
Start: 2020-06-23 | End: 2020-06-23

## 2020-06-23 RX ORDER — ONDANSETRON 4 MG/1
4 TABLET, ORALLY DISINTEGRATING ORAL EVERY 30 MIN PRN
Status: DISCONTINUED | OUTPATIENT
Start: 2020-06-23 | End: 2020-06-23 | Stop reason: HOSPADM

## 2020-06-23 RX ORDER — DEXAMETHASONE SODIUM PHOSPHATE 10 MG/ML
INJECTION, SOLUTION INTRAMUSCULAR; INTRAVENOUS PRN
Status: DISCONTINUED | OUTPATIENT
Start: 2020-06-23 | End: 2020-06-23

## 2020-06-23 RX ORDER — ONDANSETRON 2 MG/ML
4 INJECTION INTRAMUSCULAR; INTRAVENOUS EVERY 30 MIN PRN
Status: DISCONTINUED | OUTPATIENT
Start: 2020-06-23 | End: 2020-06-23 | Stop reason: HOSPADM

## 2020-06-23 RX ORDER — FENTANYL CITRATE 50 UG/ML
25-50 INJECTION, SOLUTION INTRAMUSCULAR; INTRAVENOUS EVERY 5 MIN PRN
Status: DISCONTINUED | OUTPATIENT
Start: 2020-06-23 | End: 2020-06-23 | Stop reason: HOSPADM

## 2020-06-23 RX ORDER — CEFOTETAN AND DEXTROSE 1 G/50ML
1 INJECTION, SOLUTION INTRAVENOUS
Status: DISCONTINUED | OUTPATIENT
Start: 2020-06-23 | End: 2020-06-23 | Stop reason: HOSPADM

## 2020-06-23 RX ORDER — HYDROMORPHONE HYDROCHLORIDE 1 MG/ML
.3-.5 INJECTION, SOLUTION INTRAMUSCULAR; INTRAVENOUS; SUBCUTANEOUS EVERY 10 MIN PRN
Status: DISCONTINUED | OUTPATIENT
Start: 2020-06-23 | End: 2020-06-23 | Stop reason: HOSPADM

## 2020-06-23 RX ADMIN — PROPOFOL 150 MG: 10 INJECTION, EMULSION INTRAVENOUS at 11:23

## 2020-06-23 RX ADMIN — LIDOCAINE HYDROCHLORIDE 40 MG: 20 INJECTION, SOLUTION INFILTRATION; PERINEURAL at 11:23

## 2020-06-23 RX ADMIN — CEFOTETAN AND DEXTROSE 1 G: 1 INJECTION, SOLUTION INTRAVENOUS at 11:29

## 2020-06-23 RX ADMIN — SODIUM CHLORIDE, POTASSIUM CHLORIDE, SODIUM LACTATE AND CALCIUM CHLORIDE: 600; 310; 30; 20 INJECTION, SOLUTION INTRAVENOUS at 10:40

## 2020-06-23 RX ADMIN — DEXAMETHASONE SODIUM PHOSPHATE 10 MG: 10 INJECTION, SOLUTION INTRAMUSCULAR; INTRAVENOUS at 11:29

## 2020-06-23 RX ADMIN — FENTANYL CITRATE 100 MCG: 50 INJECTION, SOLUTION INTRAMUSCULAR; INTRAVENOUS at 11:20

## 2020-06-23 RX ADMIN — ACETAMINOPHEN 975 MG: 325 TABLET, FILM COATED ORAL at 10:01

## 2020-06-23 RX ADMIN — KETOROLAC TROMETHAMINE 30 MG: 30 INJECTION, SOLUTION INTRAMUSCULAR at 11:38

## 2020-06-23 RX ADMIN — Medication 100 MG: at 11:23

## 2020-06-23 RX ADMIN — ONDANSETRON 4 MG: 2 INJECTION INTRAMUSCULAR; INTRAVENOUS at 11:29

## 2020-06-23 RX ADMIN — OXYCODONE HYDROCHLORIDE 5 MG: 5 TABLET ORAL at 13:52

## 2020-06-23 RX ADMIN — MIDAZOLAM 2 MG: 1 INJECTION INTRAMUSCULAR; INTRAVENOUS at 11:20

## 2020-06-23 ASSESSMENT — MIFFLIN-ST. JEOR: SCORE: 1684.38

## 2020-06-23 NOTE — ANESTHESIA CARE TRANSFER NOTE
Patient: Edmundo Guerrero    Procedure(s):  pilonidal cyst excision    Diagnosis: Pilonidal disease [L98.8]  Diagnosis Additional Information: No value filed.    Anesthesia Type:   General     Note:  Airway :Nasal Cannula  Patient transferred to:PACU  Handoff Report: Identifed the Patient, Identified the Reponsible Provider, Reviewed the pertinent medical history, Discussed the surgical course, Reviewed Intra-OP anesthesia mangement and issues during anesthesia, Set expectations for post-procedure period and Allowed opportunity for questions and acknowledgement of understanding      Vitals: (Last set prior to Anesthesia Care Transfer)    CRNA VITALS  6/23/2020 1138 - 6/23/2020 1213      6/23/2020             Pulse:  91    SpO2:  100 %    Resp Rate (observed):  8                Electronically Signed By: TERI Parker CRNA  June 23, 2020  12:13 PM

## 2020-06-23 NOTE — OP NOTE
[facility]   Operative Note    Pre-operative diagnosis: Pilonidal disease [L98.8]   Post-operative diagnosis Same as above   Procedure: Procedure(s):  pilonidal cyst excision   Surgeon(s): Surgeon(s) and Role:     * Benjamin Howard MD - Primary   Estimated blood loss: * No values recorded between 6/23/2020 11:42 AM and 6/23/2020 12:04 PM *    Specimens: ID Type Source Tests Collected by Time Destination   A : pilonidal cyst Cyst Buttock SURGICAL PATHOLOGY EXAM Benjamin Howard MD 6/23/2020 11:56 AM       Findings: Pilonidal sinus with no abscess or tracts inferior or superiorly     Description of procedure:   The patient was transferred to the operating room and underwent general endotracheal anesthetic.  He was then placed in the prone position on the operating room table.  The area was prepped and draped in sterile fashion.  A timeout was then done indicating patient, procedure, and antibiotics given.  The pilonidal sinus was probed with no identification of any tracks superiorly or inferiorly I began with injection of local anesthetic around the area of pilonidal disease at the inferior aspect of the gluteal cleft.  An ellipse was created around the pilonidal disease using a scalpel.  The pilonidal disease was dissected free from subcutaneous fat with electrocautery.  The specimen was then passed off the field for pathologic examination.  The wound was irrigated thoroughly.  An additional skin margin along the right inferior gluteal crease was excised to allow for closure off the midline.  Hemostasis was controlled with electrocautery.  Deep dermal approximation was performed with 3-0 Vicryl simple interrupted sutures.  The skin was closed with a running subcuticular 4-0 Vicryl suture.  The incision was then cleaned and dried.  Benzoin, Steri-Strips, and a sterile dressing was applied.  The patient was placed back in the transportation cart and extubated.  All counts were correct at the  conclusion of the case.  The patient was transferred to the PACU in stable condition.

## 2020-06-23 NOTE — ANESTHESIA POSTPROCEDURE EVALUATION
Patient: Edmundo Guerrero    Procedure(s):  pilonidal cyst excision    Diagnosis:Pilonidal disease [L98.8]  Diagnosis Additional Information: No value filed.    Anesthesia Type:  General    Note:  Anesthesia Post Evaluation    Patient location during evaluation: Bedside  Patient participation: Able to fully participate in evaluation  Level of consciousness: awake  Pain management: adequate  Airway patency: patent  Cardiovascular status: acceptable  Respiratory status: acceptable  Hydration status: acceptable  PONV: none     Anesthetic complications: None          Last vitals:  Vitals:    06/23/20 1310 06/23/20 1315 06/23/20 1335   BP: 133/82 129/80 142/89   Pulse: 70 64 71   Resp: 16 20 16   Temp:      SpO2: 100% 100% 96%         Electronically Signed By: TERI Cintron CRNA  June 23, 2020  1:40 PM

## 2020-06-23 NOTE — OR NURSING
Dr. Howard visited the patient at the bedside post op and gave follow up and dressing change instructions. Hand off report to NKECHI Zambrano.

## 2020-06-23 NOTE — OR NURSING
The patient is waiting for a post op visit from Dr. Howard. He states he has over an hour ride home and requests some pain medication as he is starting to have discomfort in the surgical area.

## 2020-06-23 NOTE — OR NURSING
"The patient continues to be sleepy when not stimulated. Coughs and deep breathes to command and denies surgical site pain. States some \"scratchy throat\" discomfort.  "

## 2020-06-23 NOTE — H&P
Surgery Consult Clinic Note      RE: Edmundo Guerrero  : 1995    Chief Complaint:  Pilonidal cyst    History of Present Illness:  Dr. Howard originally saw Mr. Guerrero on 2020 for evaluation regarding pilonidal disease.  Please refer to that note for further detail.  He reports occasional bloody drainage when he wipes, but no pain.  He is here today to update his H&P.  Edmundo is scheduled for pilonidal cyst excision on 2020, which is outside the 30 day anesthesia clearance guidelines.  This was done because of scheduling availability due to COVID 19.   He specifically denies fevers, chills, nausea, vomiting, chest pain, shortness of breath, palpitations, sore throat, cough, or generalized feeling ill.  He had a negative COVID 19 PCR test on 2020    Medical history:  History reviewed. No pertinent past medical history.    Surgical history:  Past Surgical History:   Procedure Laterality Date     CYSTECTOMY PILONIDAL N/A 2018    Procedure: CYSTECTOMY PILONIDAL;  PILONIDAL CYSTECTOMY;  Surgeon: Benjamin Howard MD;  Location: [facility] OR       Family history:  History reviewed. No pertinent family history.    Medications:  Prior to Admission medications    Medication Sig Start Date End Date Taking? Authorizing Provider   multivitamin w/minerals (THERA-VIT-M) tablet Take 1 tablet by mouth daily   Yes Reported, Patient     Allergies:  The patient has No Known Allergies.  .  Social history:  Social History     Tobacco Use     Smoking status: Never Smoker     Smokeless tobacco: Never Used   Substance Use Topics     Alcohol use: Not on file     Marital status: single.    Review of Systems:    Constitutional: Negative for fever, chills and weight loss.   HENT: Negative for ear pain, nosebleeds, congestion, sore throat, tinnitus and ear discharge.    Eyes: Negative for blurred vision, double vision, photophobia and pain.   Respiratory: Negative for cough, hemoptysis, shortness of breath, wheezing  "and stridor.    Cardiovascular: Negative for chest pain, palpitations and orthopnea.   Gastrointestinal: Negative for heartburn, nausea, vomiting, abdominal pain and blood in stool.   Genitourinary: Negative for urgency, frequency and hematuria.   Musculoskeletal: Negative for myalgias, back pain and joint pain.   Neurological: Negative for tingling, speech change and headaches.   Endo/Heme/Allergies: Does not bruise/bleed easily.   Psychiatric/Behavioral: Negative for depression, suicidal ideas and hallucinations. The patient is not nervous/anxious.    Physical Examination:  /97   Pulse 79   Temp 98.5  F (36.9  C) (Oral)   Ht 1.753 m (5' 9\")   Wt 69.4 kg (153 lb)   SpO2 100%   BMI 22.59 kg/m    General: Alert and orientedx4, no acute distress, well-developed/well-nourished, ambulating without assistance  HEENT: normocephalic atraumatic, extraocular movements intact, PERRL Sclerae anicteric; Trachea mideline, no JVD  Chest:   Clear to auscultation bilaterally.  Cardiac: S1S2 , regular rate and rhythm without additional sounds  Abdomen: Soft, non-tender, non-distended  Extremities: Cursory exam unremarkable.  No peripheral edema noted.  Skin: Warm, dry, < 2 sec cap refill  Neuro: CN 2-12 grossly intact, no focal deficit, GCS 15  Psych: Pleasant, calm, asks appropriate questions      Assessment/Plan:  #1 Pilonidal cyst excision    The indications, risks, benefits, and althernatives of pilonidal cyst excision were outlined with risks including, but not limited to, bleeding, infection,  Incomplete excision, and recurrence.  His questions were asked and answered.  We will proceed with procedure as scheduled.  Veronica Avalos [facility]  [address] [place] [address]    [phone]    Referring Provider:  No referring provider defined for this encounter.     Primary Care Provider:  Jayme Canas"

## 2020-06-23 NOTE — OR NURSING
Patient and responsible adult given discharge instructions with no questions regarding instructions. Alfonso score 20. Pain level 0/10.  Discharged from unit via ambulation. Patient discharged to home.

## 2020-06-23 NOTE — OR NURSING
Pateint discharged to phase 2 care starting in PACU.  Alfonso score 10. Pain level 0/10.  Discharged from unit via cart.  Hand off report given to same nurse caring for patient.

## 2020-06-24 LAB — COPATH REPORT: NORMAL

## 2020-06-30 ENCOUNTER — TELEPHONE (OUTPATIENT)
Dept: SURGERY | Facility: OTHER | Age: 25
End: 2020-06-30

## 2020-06-30 NOTE — TELEPHONE ENCOUNTER
Talked to patient and he stated that the first three days post surgery he was fine. He took the pain medication as needed. Then last night around 9pm he was in a lot of pain and had his father look at his bottom where the incision was made. Patient stated that his father noticed a fair amount of blood and a white creamy discharge. No noted foul smelling odor, elevated temp or redness or inflammation around the area.   Patient didn't know if this was normal post surgery.   Talked to the wound nurse and Dr. Echavarria and they indicated that it could be a seroma and as long as there are no signs of infection to treat as told and to come back for his two week post op.  Notified patient of this info. Patients father was still apprehensive of the blood loss and the pain seems to be getting worse. Patient's father is going to take a picture and send it via [employer]. Will have Dr. Echavarria assess it and call patient back  Elsa Bruce LPN

## 2020-06-30 NOTE — TELEPHONE ENCOUNTER
Patient called in and stated that he had a pilonidal cyst removed and he stated that it is bloody and leaking patient would like a call back [phone]

## 2020-07-01 ENCOUNTER — OFFICE VISIT (OUTPATIENT)
Dept: SURGERY | Facility: OTHER | Age: 25
End: 2020-07-01
Attending: SURGERY
Payer: COMMERCIAL

## 2020-07-01 ENCOUNTER — PREP FOR PROCEDURE (OUTPATIENT)
Dept: SURGERY | Facility: OTHER | Age: 25
End: 2020-07-01

## 2020-07-01 ENCOUNTER — OFFICE VISIT (OUTPATIENT)
Dept: FAMILY MEDICINE | Facility: OTHER | Age: 25
End: 2020-07-01
Attending: FAMILY MEDICINE
Payer: COMMERCIAL

## 2020-07-01 VITALS
TEMPERATURE: 97.8 F | OXYGEN SATURATION: 98 % | HEART RATE: 95 BPM | SYSTOLIC BLOOD PRESSURE: 154 MMHG | DIASTOLIC BLOOD PRESSURE: 92 MMHG | WEIGHT: 153 LBS | BODY MASS INDEX: 22.59 KG/M2

## 2020-07-01 DIAGNOSIS — T81.49XA POST-OPERATIVE WOUND ABSCESS: Primary | ICD-10-CM

## 2020-07-01 DIAGNOSIS — Z01.818 PRE-OPERATIVE EXAMINATION: Primary | ICD-10-CM

## 2020-07-01 DIAGNOSIS — L98.8 PILONIDAL DISEASE: ICD-10-CM

## 2020-07-01 DIAGNOSIS — Z01.818 PREOP TESTING: Primary | ICD-10-CM

## 2020-07-01 PROCEDURE — 99213 OFFICE O/P EST LOW 20 MIN: CPT | Performed by: SURGERY

## 2020-07-01 PROCEDURE — 87635 SARS-COV-2 COVID-19 AMP PRB: CPT | Performed by: SURGERY

## 2020-07-01 ASSESSMENT — PAIN SCALES - GENERAL: PAINLEVEL: MILD PAIN (2)

## 2020-07-01 NOTE — NURSING NOTE
"Chief Complaint   Patient presents with     Post-Op - General Surgery     pilonidal cyst excision on 6/26- Anita patient- coming in earlier due to the patient's c/o amount of blood and drainage - ok's by both providers       Initial BP (!) 154/92 (BP Location: Left arm, Patient Position: Standing, Cuff Size: Adult Regular)   Pulse 95   Temp 97.8  F (36.6  C) (Tympanic)   Wt 69.4 kg (153 lb)   SpO2 98%   BMI 22.59 kg/m   Estimated body mass index is 22.59 kg/m  as calculated from the following:    Height as of 6/23/20: 1.753 m (5' 9\").    Weight as of this encounter: 69.4 kg (153 lb).  Medication Reconciliation: complete  Elsa Bruce LPN  "

## 2020-07-01 NOTE — PATIENT INSTRUCTIONS
Thank you for allowing our surgical team to participate in your care. Please review the instructions below.   If you have questions, you may contact us at the any of the following numbers:     [facility] Health Unit Coordinator: [phone]  Clinic Surgery Nurse (Elsa): [phone]  Hospital Surgery Education Nurse: [phone]    You are scheduled for: Incisional Post Absess Wound with Dr. Echavarria on July 2, 2020  Admit Time: Hospital Surgery will call you the day before your procedure by 5pm with your arrival time.   If your surgery is on Monday, expect a call on Friday.   If you are not contacted before 5PM, please call admitting at [phone].   After hours or on weekends, please call [phone] to postpone.     Call the clinic surgery nurse if you become ill within 1-2 weeks of your procedure to reschedule.   This includes fever, chills, sore throat, cough, chest congestion, runny nose, or any other symptom of any other illness.     NO Preop appointment needed within the 30 days prior to your procedure. covid tests Covid needed .    Please call the Hospital Surgery Education Nurse at [phone] 1-2 weeks prior to your procedure and have a medication list ready.     Do not take Aspirin or other NSAIDS (Ibuprofen, Motrin, Aleve, Celebrex, Naproxen, etc), vitamins/supplements 7 days before your surgery unless you have been otherwise directed.   If you are prescribed a daily 81mg Aspirin, you may continue this.   If you are prescribed blood thinners or insulin, please contact your primary care provider for instructions.    Shower the night before and the morning of your procedure with hibiclens soap.  On The Night Before Your Surgery:  1.  Remove your jewelry and leave off until after surgery. Wash your hair and body with your regular soap/shampoo. Use a freshly washed washcloth.   Include cleaning inside your belly button. Rinse off soap/shampoo.  2. Wash your body from neck to feet using 1/2 of the 1st Hibiclens  (Chlorhexidine) bottle with your bare hands. Do not use the Hibiclens on your face, hair or genital area. Leave the soap on your body for one minute and rinse.  3. Repeat step 2 with the 2nd half of the bottle.  4. Rinse off all soap and dry with a freshly washed towel. Do not use lotions or hair products.  5. Sleep in freshly washed pajamas and freshly washed bedding.  On The Morning of Your Surgery:  1.Wash your hair and body with your regular soap/shampoo. Use another freshly washed washcloth. Rinse off.   2. Wash your body from neck to feet using 1/2 of the 2nd Hibiclens (Chlorhexidine) bottle with your bare hands. Leave the soap on your body for one minute and rinse.  3. Repeat step 2 with the 2nd half of the bottle.  4. Rinse off all the soap and dry with another freshly washed towel. Do not use lotions or hair products.  5. Wear freshly washed clothing to the hospital.    Do not have anything to eat or drink after midnight the night before your surgery (or 8 hours prior to surgery), except clear liquids (water, clear juice, clear broth, plain coffee or tea without cream or milk) up until 2 hours prior to arrival time.   Nothing by mouth after the 2 hours prior to arrival.  Do not chew gum, suck on hard candy, or smoke. You can brush your teeth.   If you are directed to take any medications, take them with a tiny sip of water.   If you use an inhaler, bring it with you.    Arrive in clean, comfortable clothing.   Do not wear any jewelry, make-up, nail polish, lotions, hair products, or perfumes.   [place] in hospital admitting through the [facility].    A responsible adult must be available to drive you home and stay with you for 24 hours at home. If you need to take a taxi or the bus, you must have another responsible adult to ride with you. Your procedure will be cancelled if you do not have a responsible adult .     Return to clinic for a postop appointment 2 week(s) from your surgery  date.

## 2020-07-01 NOTE — PROGRESS NOTES
CLINIC NOTE - POST-OP SURGERY  7/1/2020    Patient:Ryan Núñez    Procedure: Pilonidal cystectomy by Dr. Rand    This is a 24 year old male who is 1 weeks s/p A pilonidal cystectomy by Dr. Rand.  The patient comes in today with complaint of bleeding and discharge from his wound.  No fevers, no chills.  Note the wound was closed.    Current Medications:  Current Outpatient Medications   Medication Sig Dispense Refill     multivitamin w/minerals (THERA-VIT-M) tablet Take 1 tablet by mouth daily       senna-docusate (SENOKOT-S/PERICOLACE) 8.6-50 MG tablet Take 1-2 tablets by mouth 2 times daily Use to prevent or treat constipation. 30 tablet 0     oxyCODONE (ROXICODONE) 5 MG tablet Take 1-2 tablets (5-10 mg) by mouth every 6 hours as needed for severe pain (Patient not taking: Reported on 7/1/2020) 30 tablet 0       Allergies:  No Known Allergies    PHYSICAL EXAM:   Vital signs: BP (!) 154/92 (BP Location: Left arm, Patient Position: Standing, Cuff Size: Adult Regular)   Pulse 95   Temp 97.8  F (36.6  C) (Tympanic)   Wt 69.4 kg (153 lb)   SpO2 98%   BMI 22.59 kg/m     Weight: [unfilled]   BMI: Body mass index is 22.59 kg/m .   General: Normal, healthy, cooperative   Lungs: clear to auscultation   CV: Regular rate and rhythm without murmer   Abdominal: abdomen is soft without significant tenderness, masses, organomegaly or guarding   Wounds: Healing surgical wound along the cleft.  On pressure if there is purulent discharge from the lower aspect of the wound.         PATHOLOGY:  Copath Report   Date Value Ref Range Status   06/23/2020   Final    Patient Name: RYAN NÚÑEZ  MR#: [id number]  Specimen #:   Collected: 6/23/2020  Received: 6/23/2020  Reported: 6/24/2020 13:21  Ordering Phy(s): ANTON RAND  Additional Phy(s): SHELLIE RAMOS    For improved result formatting, select 'View Enhanced Report Format' under   Linked Documents section.    SPECIMEN(S):  Cyst, buttock    FINAL  DIAGNOSIS:  Skin, buttock, excision  - Pilonidal cyst    I have personally reviewed all specimens and/or slides, including the   listed special stains, and used them  with my medical judgement to determine or confirm the final diagnosis.    Electronically signed out by:    Joni Taylor M.D.    CLINICAL HISTORY:  Pilonidal disease [L98.8]; pilonidal cyst excision    GROSS:  There are two ellipses of skin and subcutaneous tissue.  Together they are   2.5 x 2 x 1 cm.  One has a  longitudinal furrow running down the center.  Sectioning this piece of   tissue the subcutaneous tissue is  erythematous and brown.  Sections from both pieces of tissue are taken. (4   in 1 block). (Dictated by: Joni Taylor MD 6/23/2020 02:13 PM)    MICROSCOPIC:  There is skin with granulation tissue surrounding hair shafts consistent   with a pilonidal cyst.    CPT Codes  A: 39728-UQ8    COLLECTION SITE:  Client: [facility]  Location: [facility] ()    The technical component of this testing was completed at the [facility], with the  professional component performed at the [facility], [address] [place] [address]  ([phone])           ASSESSMENT:    24 year old male who is 1 weeks s/p Pilonidal cystectomy by Dr. Howard.  The patient has developed an abscess.  No signs of systemic infection.  The wound is draining.       PLAN:   Pratima with him the options and I feel that we should take him to the operating room open the wound up and packet allowing for healing by secondary intention.  Amenable to that.  We will schedule that for tomorrow.  We will get a COVID test today and if possible will have a prior to surgery.    The risks, benefits, and alternatives to the planned procedure were fully discussed with the patient and/or the patient's representative(s). The risks of bleeding, infection, death, missing pathology, the need for additional procedures intra-operatively,  the possible need for intra-operative consults, the possible need for transfusion therapy, cardiopulmonary compromise, the possible need for additional surgery for a complication were discussed with the patient and/or the patient's representative(s). The patient's and/or patient's representative(s) questions were addressed and answered. Informed consent was obtained from the patient and/or the patient's representative(s). The patient and/or the patient's representative(s) consent to proceed.

## 2020-07-02 ENCOUNTER — ANESTHESIA (OUTPATIENT)
Dept: SURGERY | Facility: HOSPITAL | Age: 25
End: 2020-07-02
Payer: COMMERCIAL

## 2020-07-02 ENCOUNTER — ANESTHESIA EVENT (OUTPATIENT)
Dept: SURGERY | Facility: HOSPITAL | Age: 25
End: 2020-07-02
Payer: COMMERCIAL

## 2020-07-02 ENCOUNTER — HOSPITAL ENCOUNTER (OUTPATIENT)
Facility: HOSPITAL | Age: 25
Discharge: HOME | End: 2020-07-02
Attending: SURGERY | Admitting: SURGERY
Payer: COMMERCIAL

## 2020-07-02 VITALS
SYSTOLIC BLOOD PRESSURE: 138 MMHG | HEART RATE: 89 BPM | HEIGHT: 69 IN | BODY MASS INDEX: 22.66 KG/M2 | DIASTOLIC BLOOD PRESSURE: 90 MMHG | RESPIRATION RATE: 16 BRPM | OXYGEN SATURATION: 100 % | WEIGHT: 153 LBS | TEMPERATURE: 97 F

## 2020-07-02 DIAGNOSIS — T81.49XA POST-OPERATIVE WOUND ABSCESS: ICD-10-CM

## 2020-07-02 DIAGNOSIS — L98.8 PILONIDAL DISEASE: ICD-10-CM

## 2020-07-02 LAB
SARS-COV-2 PCR COMMENT: NORMAL
SARS-COV-2 RNA SPEC QL NAA+PROBE: NEGATIVE
SARS-COV-2 RNA SPEC QL NAA+PROBE: NORMAL
SPECIMEN SOURCE: NORMAL
SPECIMEN SOURCE: NORMAL

## 2020-07-02 PROCEDURE — 25000128 H RX IP 250 OP 636: Performed by: NURSE ANESTHETIST, CERTIFIED REGISTERED

## 2020-07-02 PROCEDURE — 25800030 ZZH RX IP 258 OP 636: Performed by: NURSE ANESTHETIST, CERTIFIED REGISTERED

## 2020-07-02 PROCEDURE — 36000056 ZZH SURGERY LEVEL 3 1ST 30 MIN: Performed by: SURGERY

## 2020-07-02 PROCEDURE — 40000306 ZZH STATISTIC PRE PROC ASSESS II: Performed by: SURGERY

## 2020-07-02 PROCEDURE — 25000125 ZZHC RX 250: Performed by: NURSE ANESTHETIST, CERTIFIED REGISTERED

## 2020-07-02 PROCEDURE — 71000027 ZZH RECOVERY PHASE 2 EACH 15 MINS: Performed by: SURGERY

## 2020-07-02 PROCEDURE — 25000128 H RX IP 250 OP 636: Performed by: SURGERY

## 2020-07-02 PROCEDURE — 36000058 ZZH SURGERY LEVEL 3 EA 15 ADDTL MIN: Performed by: SURGERY

## 2020-07-02 PROCEDURE — 10180 I&D COMPLEX PO WOUND INFCTJ: CPT | Mod: 78 | Performed by: SURGERY

## 2020-07-02 PROCEDURE — 11770 EXC PILONIDAL CYST SIMPLE: CPT | Performed by: NURSE ANESTHETIST, CERTIFIED REGISTERED

## 2020-07-02 PROCEDURE — 37000009 ZZH ANESTHESIA TECHNICAL FEE, EACH ADDTL 15 MIN: Performed by: SURGERY

## 2020-07-02 PROCEDURE — 37000008 ZZH ANESTHESIA TECHNICAL FEE, 1ST 30 MIN: Performed by: SURGERY

## 2020-07-02 PROCEDURE — 27210794 ZZH OR GENERAL SUPPLY STERILE: Performed by: SURGERY

## 2020-07-02 RX ORDER — SODIUM CHLORIDE, SODIUM LACTATE, POTASSIUM CHLORIDE, CALCIUM CHLORIDE 600; 310; 30; 20 MG/100ML; MG/100ML; MG/100ML; MG/100ML
INJECTION, SOLUTION INTRAVENOUS CONTINUOUS
Status: DISCONTINUED | OUTPATIENT
Start: 2020-07-02 | End: 2020-07-02 | Stop reason: HOSPADM

## 2020-07-02 RX ORDER — HYDROMORPHONE HYDROCHLORIDE 1 MG/ML
.3-.5 INJECTION, SOLUTION INTRAMUSCULAR; INTRAVENOUS; SUBCUTANEOUS EVERY 10 MIN PRN
Status: DISCONTINUED | OUTPATIENT
Start: 2020-07-02 | End: 2020-07-02 | Stop reason: HOSPADM

## 2020-07-02 RX ORDER — ONDANSETRON 2 MG/ML
4 INJECTION INTRAMUSCULAR; INTRAVENOUS EVERY 30 MIN PRN
Status: DISCONTINUED | OUTPATIENT
Start: 2020-07-02 | End: 2020-07-02 | Stop reason: HOSPADM

## 2020-07-02 RX ORDER — PROPOFOL 10 MG/ML
INJECTION, EMULSION INTRAVENOUS CONTINUOUS PRN
Status: DISCONTINUED | OUTPATIENT
Start: 2020-07-02 | End: 2020-07-02

## 2020-07-02 RX ORDER — ONDANSETRON 4 MG/1
4 TABLET, ORALLY DISINTEGRATING ORAL EVERY 30 MIN PRN
Status: DISCONTINUED | OUTPATIENT
Start: 2020-07-02 | End: 2020-07-02 | Stop reason: HOSPADM

## 2020-07-02 RX ORDER — PROPOFOL 10 MG/ML
INJECTION, EMULSION INTRAVENOUS PRN
Status: DISCONTINUED | OUTPATIENT
Start: 2020-07-02 | End: 2020-07-02

## 2020-07-02 RX ORDER — FENTANYL CITRATE 50 UG/ML
25-50 INJECTION, SOLUTION INTRAMUSCULAR; INTRAVENOUS
Status: DISCONTINUED | OUTPATIENT
Start: 2020-07-02 | End: 2020-07-02 | Stop reason: HOSPADM

## 2020-07-02 RX ORDER — NALOXONE HYDROCHLORIDE 0.4 MG/ML
.1-.4 INJECTION, SOLUTION INTRAMUSCULAR; INTRAVENOUS; SUBCUTANEOUS
Status: DISCONTINUED | OUTPATIENT
Start: 2020-07-02 | End: 2020-07-02 | Stop reason: HOSPADM

## 2020-07-02 RX ORDER — FENTANYL CITRATE 50 UG/ML
INJECTION, SOLUTION INTRAMUSCULAR; INTRAVENOUS PRN
Status: DISCONTINUED | OUTPATIENT
Start: 2020-07-02 | End: 2020-07-02

## 2020-07-02 RX ORDER — KETAMINE HYDROCHLORIDE 10 MG/ML
INJECTION INTRAMUSCULAR; INTRAVENOUS PRN
Status: DISCONTINUED | OUTPATIENT
Start: 2020-07-02 | End: 2020-07-02

## 2020-07-02 RX ORDER — MEPERIDINE HYDROCHLORIDE 25 MG/ML
12.5 INJECTION INTRAMUSCULAR; INTRAVENOUS; SUBCUTANEOUS
Status: DISCONTINUED | OUTPATIENT
Start: 2020-07-02 | End: 2020-07-02 | Stop reason: HOSPADM

## 2020-07-02 RX ORDER — LIDOCAINE 40 MG/G
CREAM TOPICAL
Status: DISCONTINUED | OUTPATIENT
Start: 2020-07-02 | End: 2020-07-02 | Stop reason: HOSPADM

## 2020-07-02 RX ORDER — BUPIVACAINE HYDROCHLORIDE 2.5 MG/ML
INJECTION, SOLUTION INFILTRATION; PERINEURAL PRN
Status: DISCONTINUED | OUTPATIENT
Start: 2020-07-02 | End: 2020-07-02 | Stop reason: HOSPADM

## 2020-07-02 RX ORDER — OXYCODONE HYDROCHLORIDE 5 MG/1
5 TABLET ORAL EVERY 4 HOURS PRN
Status: DISCONTINUED | OUTPATIENT
Start: 2020-07-02 | End: 2020-07-02 | Stop reason: HOSPADM

## 2020-07-02 RX ADMIN — FENTANYL CITRATE 100 MCG: 50 INJECTION, SOLUTION INTRAMUSCULAR; INTRAVENOUS at 10:10

## 2020-07-02 RX ADMIN — SODIUM CHLORIDE, POTASSIUM CHLORIDE, SODIUM LACTATE AND CALCIUM CHLORIDE: 600; 310; 30; 20 INJECTION, SOLUTION INTRAVENOUS at 09:29

## 2020-07-02 RX ADMIN — MIDAZOLAM 2 MG: 1 INJECTION INTRAMUSCULAR; INTRAVENOUS at 10:08

## 2020-07-02 RX ADMIN — PROPOFOL 20 MG: 10 INJECTION, EMULSION INTRAVENOUS at 10:24

## 2020-07-02 RX ADMIN — PROPOFOL 125 MCG/KG/MIN: 10 INJECTION, EMULSION INTRAVENOUS at 10:10

## 2020-07-02 RX ADMIN — KETAMINE HYDROCHLORIDE 15 MG: 10 INJECTION, SOLUTION INTRAMUSCULAR; INTRAVENOUS at 10:19

## 2020-07-02 RX ADMIN — FENTANYL CITRATE 50 MCG: 50 INJECTION INTRAMUSCULAR; INTRAVENOUS at 11:33

## 2020-07-02 RX ADMIN — KETAMINE HYDROCHLORIDE 15 MG: 10 INJECTION, SOLUTION INTRAMUSCULAR; INTRAVENOUS at 10:25

## 2020-07-02 RX ADMIN — PROPOFOL 20 MG: 10 INJECTION, EMULSION INTRAVENOUS at 10:11

## 2020-07-02 ASSESSMENT — MIFFLIN-ST. JEOR: SCORE: 1684.38

## 2020-07-02 NOTE — OR NURSING
Medicated withFentanyl 50 mcg for pain level of 5-10.  Dr Echavarria here to give discharge instructions.

## 2020-07-02 NOTE — ANESTHESIA POSTPROCEDURE EVALUATION
Patient: Edmundo Guerrero    Procedure(s):  INCISION AND DRAINAGE, RECTUM    Diagnosis:Post-operative wound abscess [T81.49XA]  Pilonidal disease [L98.8]  Diagnosis Additional Information: No value filed.    Anesthesia Type:  MAC    Note:  Anesthesia Post Evaluation    Patient location during evaluation: Bedside  Patient participation: Able to fully participate in evaluation  Level of consciousness: awake  Pain management: adequate  Cardiovascular status: acceptable  Respiratory status: acceptable  Hydration status: acceptable  PONV: none     Anesthetic complications: None          Last vitals:  Vitals:    07/02/20 0915 07/02/20 0920   BP:  160/94   Pulse:  112   Resp:  18   Temp:  97.8  F (36.6  C)   SpO2: 100% 100%         Electronically Signed By: TERI Landa CRNA  July 2, 2020  11:02 AM

## 2020-07-02 NOTE — ANESTHESIA CARE TRANSFER NOTE
Patient: Edmundo Guerrero    Procedure(s):  INCISION AND DRAINAGE, RECTUM    Diagnosis: Post-operative wound abscess [T81.49XA]  Pilonidal disease [L98.8]  Diagnosis Additional Information: No value filed.    Anesthesia Type:   MAC     Note:  Airway :Nasal Cannula  Patient transferred to:Phase II  Handoff Report: Identifed the Patient, Identified the Reponsible Provider, Reviewed the pertinent medical history, Discussed the surgical course, Reviewed Intra-OP anesthesia mangement and issues during anesthesia, Set expectations for post-procedure period and Allowed opportunity for questions and acknowledgement of understanding      Vitals: (Last set prior to Anesthesia Care Transfer)    CRNA VITALS  7/2/2020 1012 - 7/2/2020 1049      7/2/2020             Resp Rate (set):  8                Electronically Signed By: TERI Cintron CRNA  July 2, 2020  10:49 AM

## 2020-07-02 NOTE — ANESTHESIA PREPROCEDURE EVALUATION
Anesthesia Pre-Procedure Evaluation    Patient: Edmundo Guerrero   MRN: [id number] : 1995          Preoperative Diagnosis: Post-operative wound abscess [T81.49XA]  Pilonidal disease [L98.8]    Procedure(s):  INCISION AND DRAINAGE, RECTUM    No past medical history on file.  Past Surgical History:   Procedure Laterality Date     CYSTECTOMY PILONIDAL N/A 2018    Procedure: CYSTECTOMY PILONIDAL;  PILONIDAL CYSTECTOMY;  Surgeon: Benjamin Howard MD;  Location: [facility] OR     CYSTECTOMY PILONIDAL N/A 2020    Procedure: pilonidal cyst excision;  Surgeon: Benjamin Howard MD;  Location: [facility] OR       Anesthesia Evaluation     .             ROS/MED HX    ENT/Pulmonary: Comment: Seasonal allergy use of inhaler.    (+)Intermittent asthma Treatment: Inhaler prn,  , . .    Neurologic:  - neg neurologic ROS     Cardiovascular:  - neg cardiovascular ROS       METS/Exercise Tolerance:  >4 METS   Hematologic:  - neg hematologic  ROS       Musculoskeletal:  - neg musculoskeletal ROS       GI/Hepatic:  - neg GI/hepatic ROS       Renal/Genitourinary:  - ROS Renal section negative       Endo:  - neg endo ROS       Psychiatric:         Infectious Disease:  - neg infectious disease ROS       Malignancy:      - no malignancy   Other: Comment: Pilonidal cyst                         Physical Exam  Normal systems: cardiovascular, pulmonary and dental    Airway   Mallampati: I  TM distance: >3 FB  Neck ROM: full    Dental     Cardiovascular   Rhythm and rate: regular and normal      Pulmonary    breath sounds clear to auscultation            No results found for: WBC, HGB, HCT, PLT, CRP, SED, NA, POTASSIUM, CHLORIDE, CO2, BUN, CR, GLC, ADIN, PHOS, MAG, ALBUMIN, PROTTOTAL, ALT, AST, GGT, ALKPHOS, BILITOTAL, BILIDIRECT, LIPASE, AMYLASE, SYLVIA, PTT, INR, FIBR, TSH, T4, T3, HCG, HCGS, CKTOTAL, CKMB, TROPN    Preop Vitals  BP Readings from Last 3 Encounters:   20 (!) 154/92   20 140/99   20 130/70    Pulse  "Readings from Last 3 Encounters:   07/01/20 95   06/23/20 82   03/06/20 82      Resp Readings from Last 3 Encounters:   06/23/20 16   01/22/18 16    SpO2 Readings from Last 3 Encounters:   07/01/20 98%   06/23/20 100%   03/06/20 100%      Temp Readings from Last 1 Encounters:   07/01/20 97.8  F (36.6  C) (Tympanic)    Ht Readings from Last 1 Encounters:   06/23/20 1.753 m (5' 9\")      Wt Readings from Last 1 Encounters:   07/01/20 69.4 kg (153 lb)    Estimated body mass index is 22.59 kg/m  as calculated from the following:    Height as of 6/23/20: 1.753 m (5' 9\").    Weight as of 7/1/20: 69.4 kg (153 lb).       Anesthesia Plan      History & Physical Review  History and physical reviewed and following examination; no interval change.    ASA Status:  1 .    NPO Status:  > 8 hours    Plan for MAC with Propofol induction. Reason for MAC:  Deep or markedly invasive procedure (G8)  PONV prophylaxis:  Ondansetron (or other 5HT-3)  Covid test 06/20 negative  Rapid covid test submitted yesterday, no results this AM        Postoperative Care  Postoperative pain management:  IV analgesics.      Consents  Anesthetic plan, risks, benefits and alternatives discussed with:  Patient..                 TERI Dixon CRNA  "

## 2020-07-02 NOTE — OP NOTE
REPORT OF OPERATION  DATE OF PROCEDURE: 7/2/2020    PATIENT: Edmundo Guerrero    SURGERY PREFORMED: Incision and drainage of postoperative wound intergluteal cleft    PREOPERATIVE DIAGNOSIS: Infected postoperative wound secondary to pilonidal cystectomy    POSTOPERATIVE DIAGNOSIS: Same    SURGEON: Maximo Echavarria MD    ASSISTANTS: None    ANESTHESIA: Monitored Anesthesia Care    COMPLICATIONS: None apparent    TRANSFUSIONS: None    TISSUE TO PATHOLOGY: None    FINDINGS: Abscess and previous postoperative site.  This is secondary to pilonidal cystectomy.    INDICATIONS: This is a 24 year old male underwent pilonidal cystectomy with primary closure last week.  Presented to the clinic yesterday with pus coming from the wound.  Patient will be taken the operating room for opening of the wound washout and packing.    DESCRIPTIONS OF PROCEDURE IN DETAIL: After consent was obtained the patient was taken to the operative suite and enio in the prone jackknife position.  The patient was identified and the correct patient was confirmed.  Monitored Anesthesia Care was administered by anesthesia.  The patient was sterilely prepped and draped in the usual fashion.  A time out was preformed verifying the correct patient and the correct procedure.  The entire operative team was in agreement.  All necessary equipment and supplies were in the room.    On initial investigation the wound was slightly open with pus coming from it.  The wound was opened in its entirety.  The wound was then washed out using the Pulsavac.  The wound was packed with Betadine soaked gauze.  Hemostasis was assured.  Sterile dressings were applied.  The patient was then taken to the recovery room in stable condition tolerated the procedure well.

## 2020-07-02 NOTE — OR NURSING
Pain level 2-10.  Apgar 19/20.No bleeding. Home after instructions reviewed with pt and Jen girlfriend.

## 2020-07-06 NOTE — PATIENT INSTRUCTIONS
Thank you for allowing Dr. Echavarria and our surgical team to participate in your care. Please call our health unit coordinator at [phone] with scheduling questions or the nurse at [phone] with any other questions or concerns.

## 2020-07-07 ENCOUNTER — OFFICE VISIT (OUTPATIENT)
Dept: SURGERY | Facility: OTHER | Age: 25
End: 2020-07-07
Attending: SURGERY
Payer: COMMERCIAL

## 2020-07-07 VITALS
BODY MASS INDEX: 22.66 KG/M2 | HEART RATE: 77 BPM | HEIGHT: 69 IN | DIASTOLIC BLOOD PRESSURE: 84 MMHG | TEMPERATURE: 97.9 F | WEIGHT: 153 LBS | OXYGEN SATURATION: 100 % | SYSTOLIC BLOOD PRESSURE: 128 MMHG

## 2020-07-07 DIAGNOSIS — L05.91 PILONIDAL CYST: Primary | ICD-10-CM

## 2020-07-07 PROCEDURE — 99024 POSTOP FOLLOW-UP VISIT: CPT | Performed by: SURGERY

## 2020-07-07 RX ORDER — SODIUM HYPOCHLORITE 2.5 MG/ML
SOLUTION TOPICAL ONCE
Qty: 473 ML | Refills: 3 | Status: SHIPPED | OUTPATIENT
Start: 2020-07-07 | End: 2020-07-15

## 2020-07-07 ASSESSMENT — PAIN SCALES - GENERAL: PAINLEVEL: NO PAIN (0)

## 2020-07-07 ASSESSMENT — MIFFLIN-ST. JEOR: SCORE: 1684.38

## 2020-07-07 NOTE — NURSING NOTE
"Chief Complaint   Patient presents with     Post-Op - General Surgery      INCISION AND DRAINAGE, RECTUM- on 7/2       Initial /84 (Cuff Size: Adult Regular)   Pulse 77   Temp 97.9  F (36.6  C) (Tympanic)   Ht 1.753 m (5' 9\")   Wt 69.4 kg (153 lb)   SpO2 100%   BMI 22.59 kg/m   Estimated body mass index is 22.59 kg/m  as calculated from the following:    Height as of this encounter: 1.753 m (5' 9\").    Weight as of this encounter: 69.4 kg (153 lb).  Medication Reconciliation: complete  Maura Cordova LPN    "

## 2020-07-07 NOTE — PROGRESS NOTES
"CLINIC NOTE - POST-OP SURGERY  7/7/2020    Patient:Ryan Núñez    Procedure: Opening of previous pilonidal cystectomy site    This is a 24 year old male who is 1 weeks s/p Opening of previous pilonidal cystectomy site.  The patient has no complaints today.  He is doing twice daily wet-to-dry dressings with normal saline    Current Medications:  Current Outpatient Medications   Medication Sig Dispense Refill     multivitamin w/minerals (THERA-VIT-M) tablet Take 1 tablet by mouth daily       sodium hypochlorite (DAKINS HALF-STRENGTH) 0.25 % external solution Irrigate with as directed once for 1 dose Use as directed 473 mL 3     oxyCODONE (ROXICODONE) 5 MG tablet Take 1-2 tablets (5-10 mg) by mouth every 6 hours as needed for severe pain (Patient not taking: Reported on 7/7/2020) 30 tablet 0     senna-docusate (SENOKOT-S/PERICOLACE) 8.6-50 MG tablet Take 1-2 tablets by mouth 2 times daily Use to prevent or treat constipation. (Patient not taking: Reported on 7/7/2020) 30 tablet 0       Allergies:  No Known Allergies    PHYSICAL EXAM:   Vital signs: /84 (Cuff Size: Adult Regular)   Pulse 77   Temp 97.9  F (36.6  C) (Tympanic)   Ht 1.753 m (5' 9\")   Wt 69.4 kg (153 lb)   SpO2 100%   BMI 22.59 kg/m     Weight: [unfilled]   BMI: Body mass index is 22.59 kg/m .   General: Normal, healthy, cooperative, in no acute distress   Lungs: clear to auscultation   CV: Regular rate and rhythm without murmer   Abdominal: abdomen is soft without significant tenderness, masses, organomegaly or guarding   Wounds: Open wound in the intergluteal cleft.  No bleeding.  Minimal overgrowth.       PATHOLOGY:  Copath Report   Date Value Ref Range Status   06/23/2020   Final    Patient Name: RYAN NÚÑEZ  MR#: [id number]  Specimen #:   Collected: 6/23/2020  Received: 6/23/2020  Reported: 6/24/2020 13:21  Ordering Phy(s): ANTON RAND  Additional Phy(s): SHELLIE RAMOS    For improved result formatting, select 'View " Enhanced Report Format' under   Linked Documents section.    SPECIMEN(S):  Cyst, buttock    FINAL DIAGNOSIS:  Skin, buttock, excision  - Pilonidal cyst    I have personally reviewed all specimens and/or slides, including the   listed special stains, and used them  with my medical judgement to determine or confirm the final diagnosis.    Electronically signed out by:    Joni Taylor M.D.    CLINICAL HISTORY:  Pilonidal disease [L98.8]; pilonidal cyst excision    GROSS:  There are two ellipses of skin and subcutaneous tissue.  Together they are   2.5 x 2 x 1 cm.  One has a  longitudinal furrow running down the center.  Sectioning this piece of   tissue the subcutaneous tissue is  erythematous and brown.  Sections from both pieces of tissue are taken. (4   in 1 block). (Dictated by: Joni Taylor MD 6/23/2020 02:13 PM)    MICROSCOPIC:  There is skin with granulation tissue surrounding hair shafts consistent   with a pilonidal cyst.    CPT Codes  A: [id number]    COLLECTION SITE:  Client: [facility]  Location: [facility])    The technical component of this testing was completed at the [facility], with the  professional component performed at the [facility], [address] [place] [address]  ([phone])           ASSESSMENT:    24 year old male who is 1 weeks s/p Opening of previous pilonidal cystectomy site..  Doing well.     PLAN:   Continue doing wet-to-dry dressings.  We will change to Zulma 1 time a day and normal saline the other time a day.    Follow-up 1 week      Restrictions : None

## 2020-07-15 ENCOUNTER — OFFICE VISIT (OUTPATIENT)
Dept: SURGERY | Facility: OTHER | Age: 25
End: 2020-07-15
Attending: SURGERY
Payer: COMMERCIAL

## 2020-07-15 VITALS
BODY MASS INDEX: 22.66 KG/M2 | HEIGHT: 69 IN | SYSTOLIC BLOOD PRESSURE: 108 MMHG | DIASTOLIC BLOOD PRESSURE: 72 MMHG | HEART RATE: 103 BPM | OXYGEN SATURATION: 99 % | TEMPERATURE: 97.1 F | WEIGHT: 153 LBS

## 2020-07-15 DIAGNOSIS — L98.8 PILONIDAL DISEASE: Primary | ICD-10-CM

## 2020-07-15 PROCEDURE — 99024 POSTOP FOLLOW-UP VISIT: CPT | Performed by: SURGERY

## 2020-07-15 ASSESSMENT — MIFFLIN-ST. JEOR: SCORE: 1684.38

## 2020-07-15 ASSESSMENT — PAIN SCALES - GENERAL: PAINLEVEL: NO PAIN (0)

## 2020-07-15 NOTE — PROGRESS NOTES
"CLINIC NOTE - POST-OP SURGERY  7/15/2020    Patient:Edmundo Guerrero    Procedure: Opening of previous pilonidal cystectomy site    This is a 24 year old male who is 2 weeks s/p Opening of previous pilonidal cystectomy site.  The patient has no complaints today.  He is doing twice daily dressing changes with Dankin's once a day and normal saline once a day     Current Medications:  Current Outpatient Medications   Medication Sig Dispense Refill     multivitamin w/minerals (THERA-VIT-M) tablet Take 1 tablet by mouth daily         Allergies:  No Known Allergies    PHYSICAL EXAM:   Vital signs: /72 (Cuff Size: Adult Regular)   Pulse 103   Temp 97.1  F (36.2  C) (Tympanic)   Ht 1.753 m (5' 9\")   Wt 69.4 kg (153 lb)   SpO2 99%   BMI 22.59 kg/m     Weight: [unfilled]   BMI: Body mass index is 22.59 kg/m .   General: Normal, healthy, cooperative, in no acute distress   Lungs: clear to auscultation   CV: Regular rate and rhythm without murmer   Abdominal: abdomen is soft without significant tenderness, masses, organomegaly or guarding   Wounds: Open wound in the intergluteal cleft.  Wound is completely filled and granulation tissue.  Minimal overgrowth.    ASSESSMENT:    24 year old male who is 2 weeks s/p Opening of previous pilonidal cystectomy site..  Doing well.     PLAN:   Continue doing wet-to-dry dressings.  We will stay with Dankin's 1  time a day and normal saline the other time a day.    Follow-up 2 week      Restrictions : None  "

## 2020-07-15 NOTE — PATIENT INSTRUCTIONS
Thank you for allowing Dr. Echavarria and our surgical team to participate in your care. Please call our health unit coordinator at [phone] with scheduling questions or the nurse at [phone] with any other questions or concerns.

## 2020-07-15 NOTE — NURSING NOTE
"Chief Complaint   Patient presents with     RECHECK     follow up cyst        Initial /72 (Cuff Size: Adult Regular)   Pulse 103   Temp 97.1  F (36.2  C) (Tympanic)   Ht 1.753 m (5' 9\")   Wt 69.4 kg (153 lb)   SpO2 99%   BMI 22.59 kg/m   Estimated body mass index is 22.59 kg/m  as calculated from the following:    Height as of this encounter: 1.753 m (5' 9\").    Weight as of this encounter: 69.4 kg (153 lb).  Medication Reconciliation: complete  Maura Cordova LPN    "

## 2020-07-27 NOTE — PATIENT INSTRUCTIONS
Thank you for allowing Dr. Echavarria and our surgical team to participate in your care. Please call our health unit coordinator at [phone] with scheduling questions or the nurse at [phone] with any other questions or concerns.

## 2020-07-28 ENCOUNTER — OFFICE VISIT (OUTPATIENT)
Dept: SURGERY | Facility: OTHER | Age: 25
End: 2020-07-28
Attending: SURGERY
Payer: COMMERCIAL

## 2020-07-28 VITALS
TEMPERATURE: 96.7 F | DIASTOLIC BLOOD PRESSURE: 98 MMHG | OXYGEN SATURATION: 98 % | HEIGHT: 70 IN | SYSTOLIC BLOOD PRESSURE: 142 MMHG | BODY MASS INDEX: 21.9 KG/M2 | HEART RATE: 78 BPM | WEIGHT: 153 LBS

## 2020-07-28 DIAGNOSIS — L98.8 PILONIDAL DISEASE: Primary | ICD-10-CM

## 2020-07-28 PROCEDURE — 99024 POSTOP FOLLOW-UP VISIT: CPT | Performed by: SURGERY

## 2020-07-28 ASSESSMENT — MIFFLIN-ST. JEOR: SCORE: 1700.25

## 2020-07-28 ASSESSMENT — PAIN SCALES - GENERAL: PAINLEVEL: NO PAIN (0)

## 2020-07-28 NOTE — NURSING NOTE
"Chief Complaint   Patient presents with     RECHECK     follow up cyst       Initial BP (!) 142/98 (BP Location: Left arm, Cuff Size: Adult Regular)   Pulse 78   Temp 96.7  F (35.9  C) (Tympanic)   Ht 1.778 m (5' 10\")   Wt 69.4 kg (153 lb)   SpO2 98%   BMI 21.95 kg/m   Estimated body mass index is 21.95 kg/m  as calculated from the following:    Height as of this encounter: 1.778 m (5' 10\").    Weight as of this encounter: 69.4 kg (153 lb).  Medication Reconciliation: complete  Rani Canas LPN    "

## 2020-07-28 NOTE — PROGRESS NOTES
"CLINIC NOTE - POST-OP SURGERY  7/28/2020     Patient:Edmundo Guerrero    Procedure: Opening of previous pilonidal cystectomy site    This is a 24 year old male who is 4 weeks s/p Opening of previous pilonidal cystectomy site.  The patient has no complaints today.  He is doing twice daily dressing changes with Dankin's once a day and normal saline once a day     Current Medications:  Current Outpatient Medications   Medication Sig Dispense Refill     multivitamin w/minerals (THERA-VIT-M) tablet Take 1 tablet by mouth daily         Allergies:  No Known Allergies    PHYSICAL EXAM:   Vital signs: BP (!) 142/98 (BP Location: Left arm, Cuff Size: Adult Regular)   Pulse 78   Temp 96.7  F (35.9  C) (Tympanic)   Ht 1.778 m (5' 10\")   Wt 69.4 kg (153 lb)   SpO2 98%   BMI 21.95 kg/m     Weight: [unfilled]   BMI: Body mass index is 21.95 kg/m .   General: Normal, healthy, cooperative, in no acute distress   Lungs: clear to auscultation   CV: Regular rate and rhythm without murmer   Abdominal: abdomen is soft without significant tenderness, masses, organomegaly or guarding   Wounds: Open wound in the intergluteal cleft.  Wound is completely filled and granulation tissue.  Skin is starting to grow in from the edges.  It is nearly closed.  Minimal overgrowth.    ASSESSMENT:    24 year old male who is 4 weeks s/p Opening of previous pilonidal cystectomy site..  Doing well.     PLAN:   Continue doing wet-to-dry dressings.  We will stay with Dankin's 1  time a day and normal saline the other time a day.  Recommended shaving the area.  Will follow-up with me PRN     Restrictions : None  "

## 2020-12-20 ENCOUNTER — HEALTH MAINTENANCE LETTER (OUTPATIENT)
Age: 25
End: 2020-12-20

## 2021-10-03 ENCOUNTER — HEALTH MAINTENANCE LETTER (OUTPATIENT)
Age: 26
End: 2021-10-03

## 2022-01-22 ENCOUNTER — HEALTH MAINTENANCE LETTER (OUTPATIENT)
Age: 27
End: 2022-01-22

## 2022-09-04 ENCOUNTER — HEALTH MAINTENANCE LETTER (OUTPATIENT)
Age: 27
End: 2022-09-04

## 2023-04-29 ENCOUNTER — HEALTH MAINTENANCE LETTER (OUTPATIENT)
Age: 28
End: 2023-04-29

## 2024-12-13 NOTE — PATIENT INSTRUCTIONS
Thank you for allowing Dr. Howard and our surgical team to participate in your care. Please call our health unit coordinator at [phone] with scheduling questions or the nurse at [phone] with any other questions or concerns.    
17-Dec-2024

## (undated) DEVICE — SUTURE-MONOCRYL 4-0 PS-2 Y496G

## (undated) DEVICE — TOPICAL SKIN ADHESIVE EXOFIN

## (undated) DEVICE — CAUTERY PAD-POLYHESIVE II ADULT

## (undated) DEVICE — SYRINGE-ASEPTO IRRIGATION

## (undated) DEVICE — SUTURE-SILK 3-0 TIE A304H

## (undated) DEVICE — TUBING-SUCTION 20FT

## (undated) DEVICE — CANISTER-SUCTION 2000CC

## (undated) DEVICE — BLADE-SURG CLIPPER

## (undated) DEVICE — DRSG-KERLIX 6 X 6 3/4 FLUFF

## (undated) DEVICE — LABEL-STERILE PREPRINTED FOR OR

## (undated) DEVICE — VESSEL LOOP-RED MAXI

## (undated) DEVICE — PULSE LAVAGE IRRIGATION SYSTEM

## (undated) DEVICE — SWABSTICK-BENZOIN

## (undated) DEVICE — IRRIGATION-H2O 1000ML

## (undated) DEVICE — GLV-8.0 PROTEXIS PI BLUE W/NEU-THERA LF/PF

## (undated) DEVICE — IRRIGATION-NACL 1000ML

## (undated) DEVICE — TRAY-SKIN PREP POVIDONE/IODINE

## (undated) DEVICE — PACK-LAPAROTOMY-CUSTOM

## (undated) DEVICE — SUCTION TUBE-YANKAUR

## (undated) DEVICE — SUTURE-VICRYL 3-0 SH J416H

## (undated) DEVICE — MARKER-SKIN REG

## (undated) DEVICE — PACK-BASIN SET-UP

## (undated) DEVICE — GLV-7.5 PROTEXIS PI CLASSIC LF/PF

## (undated) DEVICE — DRSG-SPONGE STERILE 4 X 4

## (undated) DEVICE — LIGHT HANDLE COVER

## (undated) DEVICE — DRSG-ISLAND 4IN X 5IN

## (undated) DEVICE — APPLICATOR-CHLORAPREP 26ML TINTED CHG 2%+ 70% IPA-SURGICAL

## (undated) DEVICE — PANTIES-MESH L/XL

## (undated) DEVICE — SYRINGE-10CC LUER LOCK

## (undated) DEVICE — SUTURE-SILK 3-0 SH K832H

## (undated) DEVICE — CAUTERY-MICROFINE NEEDLE

## (undated) DEVICE — SCD SLEEVE-KNEE REG.

## (undated) DEVICE — STERI-STRIP-1/2" X 4"

## (undated) DEVICE — DRSG-ABDOMINAL 5 X 9

## (undated) DEVICE — NDL-25G 1-1/2" NON-SAFETY

## (undated) DEVICE — GLV-7.5 BIOGEL LATEX

## (undated) RX ORDER — DEXAMETHASONE SODIUM PHOSPHATE 10 MG/ML
INJECTION, SOLUTION INTRAMUSCULAR; INTRAVENOUS
Status: DISPENSED
Start: 2020-06-23

## (undated) RX ORDER — ONDANSETRON 2 MG/ML
INJECTION INTRAMUSCULAR; INTRAVENOUS
Status: DISPENSED
Start: 2020-06-23

## (undated) RX ORDER — LIDOCAINE HYDROCHLORIDE 20 MG/ML
INJECTION, SOLUTION EPIDURAL; INFILTRATION; INTRACAUDAL; PERINEURAL
Status: DISPENSED
Start: 2020-06-23

## (undated) RX ORDER — PROPOFOL 10 MG/ML
INJECTION, EMULSION INTRAVENOUS
Status: DISPENSED
Start: 2020-07-02

## (undated) RX ORDER — FENTANYL CITRATE 50 UG/ML
INJECTION, SOLUTION INTRAMUSCULAR; INTRAVENOUS
Status: DISPENSED
Start: 2018-01-22

## (undated) RX ORDER — PROPOFOL 10 MG/ML
INJECTION, EMULSION INTRAVENOUS
Status: DISPENSED
Start: 2020-06-23

## (undated) RX ORDER — KETOROLAC TROMETHAMINE 30 MG/ML
INJECTION, SOLUTION INTRAMUSCULAR; INTRAVENOUS
Status: DISPENSED
Start: 2020-06-23

## (undated) RX ORDER — KETOROLAC TROMETHAMINE 30 MG/ML
INJECTION, SOLUTION INTRAMUSCULAR; INTRAVENOUS
Status: DISPENSED
Start: 2018-01-22

## (undated) RX ORDER — LIDOCAINE HYDROCHLORIDE 20 MG/ML
INJECTION, SOLUTION EPIDURAL; INFILTRATION; INTRACAUDAL; PERINEURAL
Status: DISPENSED
Start: 2018-01-22

## (undated) RX ORDER — FENTANYL CITRATE 50 UG/ML
INJECTION, SOLUTION INTRAMUSCULAR; INTRAVENOUS
Status: DISPENSED
Start: 2020-07-02

## (undated) RX ORDER — DEXAMETHASONE SODIUM PHOSPHATE 10 MG/ML
INJECTION, SOLUTION INTRAMUSCULAR; INTRAVENOUS
Status: DISPENSED
Start: 2018-01-22

## (undated) RX ORDER — FENTANYL CITRATE 50 UG/ML
INJECTION, SOLUTION INTRAMUSCULAR; INTRAVENOUS
Status: DISPENSED
Start: 2020-06-23

## (undated) RX ORDER — KETAMINE HCL IN NACL, ISO-OSM 100MG/10ML
SYRINGE (ML) INJECTION
Status: DISPENSED
Start: 2020-07-02

## (undated) RX ORDER — PROPOFOL 10 MG/ML
INJECTION, EMULSION INTRAVENOUS
Status: DISPENSED
Start: 2018-01-22

## (undated) RX ORDER — ONDANSETRON 2 MG/ML
INJECTION INTRAMUSCULAR; INTRAVENOUS
Status: DISPENSED
Start: 2018-01-22